# Patient Record
Sex: FEMALE | Race: WHITE
[De-identification: names, ages, dates, MRNs, and addresses within clinical notes are randomized per-mention and may not be internally consistent; named-entity substitution may affect disease eponyms.]

---

## 2014-09-04 VITALS
DIASTOLIC BLOOD PRESSURE: 81 MMHG | SYSTOLIC BLOOD PRESSURE: 150 MMHG | SYSTOLIC BLOOD PRESSURE: 150 MMHG | DIASTOLIC BLOOD PRESSURE: 81 MMHG | SYSTOLIC BLOOD PRESSURE: 150 MMHG | SYSTOLIC BLOOD PRESSURE: 150 MMHG | DIASTOLIC BLOOD PRESSURE: 81 MMHG | DIASTOLIC BLOOD PRESSURE: 81 MMHG | DIASTOLIC BLOOD PRESSURE: 81 MMHG | SYSTOLIC BLOOD PRESSURE: 150 MMHG | DIASTOLIC BLOOD PRESSURE: 81 MMHG | SYSTOLIC BLOOD PRESSURE: 150 MMHG

## 2017-04-24 ENCOUNTER — HOSPITAL ENCOUNTER (OUTPATIENT)
Dept: HOSPITAL 63 - MAMMO | Age: 67
Discharge: HOME | End: 2017-04-24
Attending: FAMILY MEDICINE
Payer: MEDICARE

## 2017-04-24 DIAGNOSIS — Z12.31: Primary | ICD-10-CM

## 2017-04-24 PROCEDURE — 77063 BREAST TOMOSYNTHESIS BI: CPT

## 2017-04-24 NOTE — RAD
EXAM: MAMMO DANII SCREENING BILATERAL.



HISTORY: Screening.



COMPARISON: 4/22/2016 and 4/20/2015.



FINDINGS:



2-D and 3-D tomosynthesis mammograms were obtained of both breasts in the CC

and MLO projections. Computer-aided detection (CAD) was utilized.



The breast parenchyma is primarily fatty (tissue density A). No dominant

suspicious mass, suspicious microcalcifications, or architectural distortion

is identified.



   



IMPRESSION: No mammographic evidence of malignancy.







BI-RADS CATEGORY: 1 NEGATIVE



RECOMMENDED FOLLOW-UP: 12M 12 MONTH FOLLOW-UP



PQRS compliance statement: Patient information was entered into a reminder

system with a target due date     for the next mammogram.



Mammography is a sensitive method for finding small breast cancers, but it

does not detect them all and is not a substitute for careful clinical

examination.  A negative mammogram does not negate a clinically suspicious

finding and should not result in delay in biopsying a clinically suspicious

abnormality.



"Our facility is accredited by the American College of [employer] Mammography

Program."

## 2019-12-19 ENCOUNTER — HOSPITAL ENCOUNTER (OUTPATIENT)
Dept: HOSPITAL 63 - LAB | Age: 69
Discharge: HOME | End: 2019-12-19
Payer: MEDICARE

## 2019-12-19 DIAGNOSIS — R82.71: Primary | ICD-10-CM

## 2019-12-19 PROCEDURE — 87086 URINE CULTURE/COLONY COUNT: CPT

## 2019-12-27 ENCOUNTER — HOSPITAL ENCOUNTER (OUTPATIENT)
Dept: HOSPITAL 63 - LAB | Age: 69
Discharge: HOME | End: 2019-12-27
Payer: MEDICARE

## 2019-12-27 DIAGNOSIS — R82.71: Primary | ICD-10-CM

## 2019-12-27 PROCEDURE — 87086 URINE CULTURE/COLONY COUNT: CPT

## 2020-05-04 ENCOUNTER — HOSPITAL ENCOUNTER (OUTPATIENT)
Dept: HOSPITAL 63 - LAB | Age: 70
Discharge: HOME | End: 2020-05-04
Payer: MEDICARE

## 2020-05-04 DIAGNOSIS — R82.71: Primary | ICD-10-CM

## 2020-05-04 PROCEDURE — 87086 URINE CULTURE/COLONY COUNT: CPT

## 2020-05-14 ENCOUNTER — HOSPITAL ENCOUNTER (OUTPATIENT)
Dept: HOSPITAL 63 - LAB | Age: 70
End: 2020-05-14
Payer: MEDICARE

## 2020-05-14 DIAGNOSIS — N30.20: Primary | ICD-10-CM

## 2020-05-14 PROCEDURE — 87086 URINE CULTURE/COLONY COUNT: CPT

## 2020-05-18 ENCOUNTER — HOSPITAL ENCOUNTER (OUTPATIENT)
Dept: HOSPITAL 63 - RAD | Age: 70
Discharge: HOME | End: 2020-05-18
Attending: PHYSICIAN ASSISTANT
Payer: MEDICARE

## 2020-05-18 DIAGNOSIS — Z01.818: Primary | ICD-10-CM

## 2020-05-18 DIAGNOSIS — M47.814: ICD-10-CM

## 2020-05-18 LAB
ANION GAP SERPL CALC-SCNC: 13 MMOL/L (ref 6–14)
BASOPHILS # BLD AUTO: 0.1 X10^3/UL (ref 0–0.2)
BASOPHILS NFR BLD: 2 % (ref 0–3)
CA-I SERPL ISE-MCNC: 27 MG/DL (ref 7–20)
CALCIUM SERPL-MCNC: 9.5 MG/DL (ref 8.5–10.1)
CHLORIDE SERPL-SCNC: 100 MMOL/L (ref 98–107)
CO2 SERPL-SCNC: 27 MMOL/L (ref 21–32)
CREAT SERPL-MCNC: 0.7 MG/DL (ref 0.6–1)
CREATININE, EXTERNAL: 0.7
EOSINOPHIL NFR BLD: 0.4 X10^3/UL (ref 0–0.7)
EOSINOPHIL NFR BLD: 7 % (ref 0–3)
ERYTHROCYTE [DISTWIDTH] IN BLOOD BY AUTOMATED COUNT: 12.9 % (ref 11.5–14.5)
GFR SERPLBLD BASED ON 1.73 SQ M-ARVRAT: 82.7 ML/MIN
GLUCOSE SERPL-MCNC: 112 MG/DL (ref 70–99)
HCT VFR BLD CALC: 42.6 % (ref 36–47)
HGB BLD-MCNC: 14.4 G/DL (ref 12–15.5)
LYMPHOCYTES # BLD: 1.6 X10^3/UL (ref 1–4.8)
LYMPHOCYTES NFR BLD AUTO: 29 % (ref 24–48)
MCH RBC QN AUTO: 31 PG (ref 25–35)
MCHC RBC AUTO-ENTMCNC: 34 G/DL (ref 31–37)
MCV RBC AUTO: 90 FL (ref 79–100)
MONO #: 0.5 X10^3/UL (ref 0–1.1)
MONOCYTES NFR BLD: 8 % (ref 0–9)
NEUT #: 3.1 X10^3UL (ref 1.8–7.7)
NEUTROPHILS NFR BLD AUTO: 54 % (ref 31–73)
PLATELET # BLD AUTO: 262 X10^3/UL (ref 140–400)
POTASSIUM SERPL-SCNC: 3.7 MMOL/L (ref 3.5–5.1)
RBC # BLD AUTO: 4.72 X10^6/UL (ref 3.5–5.4)
SODIUM SERPL-SCNC: 140 MMOL/L (ref 136–145)
WBC # BLD AUTO: 5.8 X10^3/UL (ref 4–11)

## 2020-05-18 PROCEDURE — 87641 MR-STAPH DNA AMP PROBE: CPT

## 2020-05-18 PROCEDURE — 93005 ELECTROCARDIOGRAM TRACING: CPT

## 2020-05-18 PROCEDURE — 71046 X-RAY EXAM CHEST 2 VIEWS: CPT

## 2020-05-18 PROCEDURE — 85025 COMPLETE CBC W/AUTO DIFF WBC: CPT

## 2020-05-18 PROCEDURE — 80048 BASIC METABOLIC PNL TOTAL CA: CPT

## 2020-05-18 PROCEDURE — 36415 COLL VENOUS BLD VENIPUNCTURE: CPT

## 2020-05-18 NOTE — EKG
[facility] [address], [place] [address]

Test Date:    2020               Test Time:    08:55:57

Pat Name:     RODRIGUEZ REDDING           Department:   

Patient ID:   [id number]           Room:          

Gender:                                Technician:   

:          1950               Requested By: STAFF NON

Order Number: [id number]            Reading MD:   Isaias Nguyen MD

                                 Measurements

Intervals                              Axis          

Rate:                                  P:            

VA:                                    QRS:          

QRSD:                                  T:            

QT:                                                  

QTc:                                                 

                           Interpretive Statements

SR



Electronically Signed On 2020 9:32:21 CDT by Isaias Nguyen MD

## 2020-05-18 NOTE — RAD
CHEST PA   LATERAL 

 

INDICATION: Preoperative, knee surgery. 

 

COMPARISON STUDY: 4/2/2010.

 

FINDINGS:

 

Lungs: Normal lung volume. No pulmonary mass or consolidation. The 

tracheobronchial tree and hilar structures are normal.

 

Pleura: No pleural effusion or pneumothorax.

 

Heart and Mediastinum: The cardiomediastinal silhouette is normal. 

Tortuosity of the thoracic aorta.

 

Bones and Soft Tissues: Right convex thoracic curvature. Multilevel 

degenerative changes of the spine.

 

IMPRESSION:  

No acute cardiopulmonary process.

 

Electronically signed by: Toni Fallon MD (5/18/2020 9:04 AM) [id number]

## 2020-05-20 NOTE — EKG
[facility] [address], [place] [address]

Test Date:    2020               Test Time:    08:55:57

Pat Name:     RODRIUGEZ REDDING           Department:   

Patient ID:   [id number]           Room:          

Gender:       F                        Technician:   

:          1950               Requested By: NATALIA YEN JR

Order Number: [id number]            Reading MD:   Ned Moran

                                 Measurements

Intervals                              Axis          

Rate:         69                       P:            49

OH:           168                      QRS:          23

QRSD:         86                       T:            42

QT:           372                                    

QTc:          400                                    

                           Interpretive Statements

SINUS RHYTHM

QRS(T) CONTOUR ABNORMALITY

CONSIDER INFERIOR INFARCT

POSSIBLY ABNORMAL ECG

RI6.02

No previous ECG available for comparison



Electronically Signed On 2020 7:57:48 CDT by Ned Moran

## 2020-07-15 VITALS — BODY MASS INDEX: 35.85 KG/M2 | WEIGHT: 210 LBS | HEIGHT: 64 IN

## 2020-07-15 RX ORDER — OXYCODONE AND ACETAMINOPHEN 5; 325 MG/1; MG/1
TABLET ORAL
COMMUNITY
End: 2021-06-21 | Stop reason: ALTCHOICE

## 2020-07-15 RX ORDER — LEVOTHYROXINE SODIUM 175 UG/1
175 TABLET ORAL
COMMUNITY
End: 2021-06-21 | Stop reason: ALTCHOICE

## 2020-07-15 RX ORDER — TEMAZEPAM 30 MG/1
CAPSULE ORAL
COMMUNITY

## 2020-07-15 RX ORDER — ESTRADIOL 0.1 MG/G
2 CREAM VAGINAL DAILY
COMMUNITY

## 2020-07-15 RX ORDER — DOXYCYCLINE 100 MG/1
100 CAPSULE ORAL 2 TIMES DAILY
COMMUNITY

## 2020-07-15 RX ORDER — AMOXICILLIN 500 MG/1
500 CAPSULE ORAL
COMMUNITY
End: 2021-06-21 | Stop reason: ALTCHOICE

## 2020-07-15 RX ORDER — CEPHALEXIN 500 MG/1
500 CAPSULE ORAL 4 TIMES DAILY
COMMUNITY

## 2020-07-15 RX ORDER — IBANDRONATE SODIUM 150 MG/1
150 TABLET, FILM COATED ORAL
COMMUNITY
End: 2021-06-21 | Stop reason: ALTCHOICE

## 2020-07-15 RX ORDER — LEVOTHYROXINE SODIUM 150 UG/1
TABLET ORAL
COMMUNITY

## 2020-07-15 RX ORDER — DEXAMETHASONE 0.5 MG/5ML
SOLUTION ORAL DAILY
COMMUNITY

## 2020-07-15 RX ORDER — CIPROFLOXACIN 500 MG/1
TABLET ORAL 2 TIMES DAILY
COMMUNITY

## 2020-07-15 RX ORDER — CEFDINIR 300 MG/1
300 CAPSULE ORAL 2 TIMES DAILY
COMMUNITY

## 2020-07-15 RX ORDER — RABEPRAZOLE SODIUM 20 MG/1
20 TABLET, DELAYED RELEASE ORAL DAILY
COMMUNITY

## 2020-07-15 RX ORDER — PROMETHAZINE HYDROCHLORIDE AND CODEINE PHOSPHATE 6.25; 1 MG/5ML; MG/5ML
SOLUTION ORAL
COMMUNITY
End: 2021-06-21 | Stop reason: ALTCHOICE

## 2020-07-15 RX ORDER — AMPICILLIN 500 MG/1
CAPSULE ORAL
COMMUNITY
End: 2021-06-21 | Stop reason: ALTCHOICE

## 2020-07-15 RX ORDER — ALBUTEROL SULFATE 90 UG/1
AEROSOL, METERED RESPIRATORY (INHALATION)
COMMUNITY

## 2020-07-15 RX ORDER — DICYCLOMINE HYDROCHLORIDE 10 MG/1
10 CAPSULE ORAL
COMMUNITY

## 2020-07-15 RX ORDER — AMOXICILLIN AND CLAVULANATE POTASSIUM 875; 125 MG/1; MG/1
TABLET, FILM COATED ORAL EVERY 12 HOURS
COMMUNITY
End: 2021-06-21 | Stop reason: ALTCHOICE

## 2020-07-15 RX ORDER — LOSARTAN POTASSIUM 50 MG/1
TABLET ORAL DAILY
COMMUNITY

## 2020-07-15 RX ORDER — METHYLPREDNISOLONE 4 MG/1
TABLET ORAL
COMMUNITY
End: 2021-06-21 | Stop reason: ALTCHOICE

## 2020-07-15 RX ORDER — LEVOFLOXACIN 750 MG/1
750 TABLET ORAL DAILY
COMMUNITY
End: 2020-07-30 | Stop reason: SDUPTHER

## 2020-07-15 RX ORDER — GABAPENTIN 100 MG/1
CAPSULE ORAL 3 TIMES DAILY
COMMUNITY

## 2020-07-15 RX ORDER — PREDNISOLONE ACETATE 10 MG/ML
1 SUSPENSION/ DROPS OPHTHALMIC 4 TIMES DAILY
COMMUNITY
End: 2021-06-21 | Stop reason: ALTCHOICE

## 2020-07-15 RX ORDER — MELOXICAM 7.5 MG/1
TABLET ORAL DAILY
COMMUNITY

## 2020-07-15 RX ORDER — TRIAMCINOLONE ACETONIDE 1 MG/G
CREAM TOPICAL 2 TIMES DAILY
COMMUNITY
End: 2021-06-21 | Stop reason: ALTCHOICE

## 2020-07-15 RX ORDER — EPINEPHRINE 0.3 MG/.3ML
0.3 INJECTION SUBCUTANEOUS
COMMUNITY

## 2020-07-29 ENCOUNTER — TELEPHONE (OUTPATIENT)
Dept: ORTHOPEDIC SURGERY | Age: 70
End: 2020-07-29

## 2020-07-29 DIAGNOSIS — Z01.818 PREOP TESTING: Primary | ICD-10-CM

## 2020-07-30 ENCOUNTER — OFFICE VISIT (OUTPATIENT)
Dept: ORTHOPEDIC SURGERY | Age: 70
End: 2020-07-30

## 2020-07-30 VITALS — BODY MASS INDEX: 36.65 KG/M2 | WEIGHT: 220 LBS | HEIGHT: 65 IN

## 2020-07-30 DIAGNOSIS — M25.562 LEFT KNEE PAIN, UNSPECIFIED CHRONICITY: Primary | ICD-10-CM

## 2020-07-30 DIAGNOSIS — E66.01 SEVERE OBESITY (HCC): ICD-10-CM

## 2020-07-30 DIAGNOSIS — M17.12 PRIMARY OSTEOARTHRITIS OF LEFT KNEE: Primary | ICD-10-CM

## 2020-07-30 RX ORDER — VITAMIN E 1000 UNIT
1000 CAPSULE ORAL
COMMUNITY

## 2020-07-30 RX ORDER — LEVOFLOXACIN 250 MG/1
TABLET ORAL
COMMUNITY
Start: 2020-07-23

## 2020-07-30 RX ORDER — FERROUS SULFATE, DRIED 160(50) MG
2 TABLET, EXTENDED RELEASE ORAL 2 TIMES DAILY WITH MEALS
COMMUNITY

## 2020-07-30 RX ORDER — GUAIFENESIN 100 MG/5ML
81 LIQUID (ML) ORAL DAILY
COMMUNITY

## 2020-07-30 RX ORDER — DEXAMETHASONE SODIUM PHOSPHATE 1 MG/ML
SOLUTION/ DROPS OPHTHALMIC
COMMUNITY

## 2020-07-30 RX ORDER — CYCLOSPORINE 0.5 MG/ML
EMULSION OPHTHALMIC
COMMUNITY

## 2020-07-30 RX ORDER — MINERAL OIL
180 ENEMA (ML) RECTAL DAILY
COMMUNITY

## 2020-07-30 RX ORDER — DENOSUMAB 60 MG/ML
INJECTION SUBCUTANEOUS
COMMUNITY

## 2020-07-30 RX ORDER — AMLODIPINE BESYLATE 5 MG/1
5 TABLET ORAL
COMMUNITY
Start: 2020-07-17

## 2020-07-30 RX ORDER — OXYCODONE AND ACETAMINOPHEN 5; 325 MG/1; MG/1
1 TABLET ORAL
Qty: 30 TAB | Refills: 0 | Status: SHIPPED | OUTPATIENT
Start: 2020-07-30 | End: 2020-08-04

## 2020-07-30 RX ORDER — CEPHALEXIN 500 MG/1
500 CAPSULE ORAL 4 TIMES DAILY
Qty: 4 CAP | Refills: 0 | Status: SHIPPED | OUTPATIENT
Start: 2020-07-30 | End: 2020-07-31

## 2020-07-30 RX ORDER — CALCIUM CARB/VITAMIN D3/VIT K1 500-500-40
1 TABLET,CHEWABLE ORAL DAILY
COMMUNITY

## 2020-07-30 RX ORDER — IBUPROFEN 200 MG
400 TABLET ORAL
COMMUNITY
End: 2021-06-21 | Stop reason: ALTCHOICE

## 2020-07-30 NOTE — PROGRESS NOTES
Subjective:     Chelsi Holguin is a 79 y.o. female who presents today for surgical preop visit. Past Medical History:   Diagnosis Date    Arthritis 04/29/2020    osteo    Heart disease     Hypertension        No past surgical history on file. Current Outpatient Medications   Medication Sig Dispense Refill    albuterol (PROVENTIL HFA, VENTOLIN HFA, PROAIR HFA) 90 mcg/actuation inhaler Take  by inhalation.  amoxicillin (AMOXIL) 500 mg capsule Take 500 mg by mouth.  cephALEXin (KEFLEX) 500 mg capsule Take 500 mg by mouth four (4) times daily.  dexAMETHasone (DECADRON) 0.5 mg/5 mL solution Take  by mouth daily.  dicyclomine (BENTYL) 10 mg capsule Take 10 mg by mouth 4 times daily (before meals and nightly).  doxycycline (VIBRAMYCIN) 100 mg capsule Take 100 mg by mouth two (2) times a day.  EPINEPHrine (EPIPEN) 0.3 mg/0.3 mL injection 0.3 mg by IntraMUSCular route once as needed for Allergic Response.  estradioL (ESTRACE) 0.01 % (0.1 mg/gram) vaginal cream Insert 2 g into vagina daily.  gabapentin (NEURONTIN) 100 mg capsule Take  by mouth three (3) times daily.  ibandronate (BONIVA) 150 mg tablet Take 150 mg by mouth every thirty (30) days.  levoFLOXacin (LEVAQUIN) 750 mg tablet Take 750 mg by mouth daily.  levothyroxine (SYNTHROID) 150 mcg tablet Take  by mouth Daily (before breakfast).  levothyroxine (SYNTHROID) 175 mcg tablet Take 175 mcg by mouth Daily (before breakfast).  losartan (COZAAR) 50 mg tablet Take  by mouth daily.  meloxicam (MOBIC) 7.5 mg tablet Take  by mouth daily.  methylPREDNISolone (MEDROL DOSEPACK) 4 mg tablet Take  by mouth.  oxyCODONE-acetaminophen (PERCOCET) 5-325 mg per tablet Take  by mouth every four (4) hours as needed for Pain.  prednisoLONE acetate (PRED FORTE) 1 % ophthalmic suspension Administer 1 Drop to both eyes four (4) times daily.       promethazine-codeine (PHENERGAN with CODEINE) 6.25-10 mg/5 mL syrup Take  by mouth four (4) times daily as needed for Cough.  RABEprazole (ACIPHEX) 20 mg tablet Take 20 mg by mouth daily.  temazepam (RESTORIL) 30 mg capsule Take  by mouth nightly as needed for Sleep.  triamcinolone acetonide (KENALOG) 0.1 % topical cream Apply  to affected area two (2) times a day. use thin layer      amoxicillin-clavulanate (AUGMENTIN) 875-125 mg per tablet Take  by mouth every twelve (12) hours.  ampicillin (PRINCIPEN) 500 mg capsule Take  by mouth Before breakfast, lunch, dinner and at bedtime.  cefdinir (OMNICEF) 300 mg capsule Take 300 mg by mouth two (2) times a day.  ciprofloxacin HCl (CIPRO) 500 mg tablet Take  by mouth two (2) times a day. Allergies   Allergen Reactions    Sulfa (Sulfonamide Antibiotics) Unknown (comments)       ROS:  Patient is a pleasant appearing individual, appropriately dressed, well hydrated, well nourished, who is alert, appropriately oriented for age, and in no acute distress with a normal gait and normal affect who does not appear to be in any significant pain. Please note that this patient will be scheduled for surgery and that Dr. Kusum Carrillo is requesting a pre-op medical clearance for GENERAL Anesthesia. Our office will schedule the appt for the medical clearance with the medical provider. After evaluating the patient, please fax all labs, EKGs, diagnostic studies, and a risk assessment / clearance note (that is legible or typed ) indicating if the patient is medically cleared to Dr. Kusum Carrillo (Attn: Surgery Scheduling) ASAP to [phone]. If the patient is not or will not be medically cleared prior to the surgery date please notify Dr. Neel Zamora office. Thank you for assisting me in this patient's care. The patient was counseled about the risks of mau Covid-19 during their perioperative period and any recovery window from their procedure.  The patient was made aware that mau Covid-19 may worsen their prognosis for recovering from their procedure and lend to a higher morbidity and/or mortality risk. All material risks, benefits, and reasonable alternatives including postponing the procedure were discussed. The patient DOES wish to proceed with their procedure at this time. Physical Exam:  Left Knee -Decrease range of motion with flexion, Knee arc of greater than 50 degrees, Some crepitation, Grossly neurovascularly intact, Good cap refill, No skin lesion, Moderate swelling, No gross instability, Some quadriceps weakness, Kellgren and Gutierrez at least grade 3    Right Knee - Full Range of Motion, No crepitation, Grossly neurovascularly intact, Good cap refill, No skin lesion, No swelling, No gross instability, No quadriceps weakness      ICD-10-CM ICD-9-CM    1. Primary osteoarthritis of left knee  M17.12 715.16        HPI:  The patient is here for a left total knee replacement preop visit[place] The patient would like to be done on an outpatient basis using old clearances. To note, the patient did have the right total knee replacement done earlier this year. The patient did have what appeared to be a reaction to adhesive and the Ioban and possibly the ChloraPrep. We will forgo the ChloraPrep wash pre-surgical washes and consider no Ioban prior to surgery with alcohol wipes being used. We will consult further with Dr. Shelly Solano prior to surgery. The patient did very well on the right otherwise, full range of motion, did well. We will get the old sizing postop note for that as well. X-rays in the past did show severe degenerative changes of the left knee failed conservative treatment. Assessment/Plan:  1. Patient with severe left knee OA. Left total knee replacement on an outpatient basis, old clearances. Patient cleared for surgery. Stop all blood thinners 7-10 days prior to surgery.   We will send in her pain prescription and antibiotic to be used postoperatively only. We will send those in today. The patient would like to do therapy here for one week after surgery. She will go home on 8/12/2020, which is her plan, as long as everything progresses appropriately. If the patient gets any new abrasion on the surgical limb, she will let us know as soon as possible, and we will go from there.             JEANNETTE Munoz

## 2020-08-03 DIAGNOSIS — M17.12 PRIMARY OSTEOARTHRITIS OF LEFT KNEE: Primary | ICD-10-CM

## 2020-08-03 DIAGNOSIS — Z01.818 PREOP TESTING: ICD-10-CM

## 2020-08-03 RX ORDER — CEPHALEXIN 500 MG/1
500 CAPSULE ORAL 4 TIMES DAILY
Qty: 4 CAP | Refills: 0 | Status: SHIPPED | OUTPATIENT
Start: 2020-08-03 | End: 2020-08-04

## 2020-08-05 ENCOUNTER — OFFICE VISIT (OUTPATIENT)
Dept: ORTHOPEDIC SURGERY | Age: 70
End: 2020-08-05
Payer: MEDICARE

## 2020-08-05 DIAGNOSIS — M17.12 PRIMARY OSTEOARTHRITIS OF LEFT KNEE: Primary | ICD-10-CM

## 2020-08-05 PROCEDURE — 99024 POSTOP FOLLOW-UP VISIT: CPT | Performed by: ORTHOPAEDIC SURGERY

## 2020-08-06 DIAGNOSIS — M25.562 LEFT KNEE PAIN, UNSPECIFIED CHRONICITY: Primary | ICD-10-CM

## 2020-08-07 ENCOUNTER — OFFICE VISIT (OUTPATIENT)
Dept: ORTHOPEDIC SURGERY | Age: 70
End: 2020-08-07
Payer: MEDICARE

## 2020-08-07 DIAGNOSIS — M25.561 CHRONIC PAIN OF RIGHT KNEE: ICD-10-CM

## 2020-08-07 DIAGNOSIS — Z96.651 HISTORY OF TOTAL RIGHT KNEE REPLACEMENT: Primary | ICD-10-CM

## 2020-08-07 DIAGNOSIS — G89.18 POST-OP PAIN: ICD-10-CM

## 2020-08-07 DIAGNOSIS — G89.29 CHRONIC PAIN OF RIGHT KNEE: ICD-10-CM

## 2020-08-07 PROCEDURE — 99024 POSTOP FOLLOW-UP VISIT: CPT | Performed by: PHYSICIAN ASSISTANT

## 2020-08-07 RX ORDER — CEPHALEXIN 500 MG/1
500 CAPSULE ORAL 4 TIMES DAILY
Qty: 28 CAP | Refills: 0 | Status: SHIPPED | OUTPATIENT
Start: 2020-08-07 | End: 2020-08-14

## 2020-08-07 RX ORDER — HYDROMORPHONE HYDROCHLORIDE 2 MG/1
2 TABLET ORAL
Qty: 30 TAB | Refills: 0 | Status: SHIPPED | OUTPATIENT
Start: 2020-08-07 | End: 2020-08-10

## 2020-08-07 NOTE — PATIENT INSTRUCTIONS
--During the patient's visit today, the patient was instructed to no longer wear the compression stocking on the unaffected leg. They were instructed that the compression stocking on the affected leg should be continued for a total of 3 weeks postoperatively. They were also reminded to continue their blood thinning medication (Aspirin) for a total of 3 weeks postoperatively as instructed at the time of discharge. Their dressing was taken down and will not need to be replaced. --The patient was instructed that they may now shower and the incision may get wet. The patient was asked not to [facility]" their wound. They are reminded that although they may shower they should avoid baths, hot tubs, lotions, pools, lakes, and springs. Patient was instructed to make sure that when the wound does get wet to dry it very well. They were also reminded to not yet use any creams, lotions, ointments, salves, antibiotic creams, or AAA. Patient was told to leave the surgical tape that is covering the incision intact for another week. At that point it can be removed. It may be slightly easier if a very small thin layer of Vaseline is utilized to break up the glue holding it in place. Once the glue comes off it is imperative they remove the Vaseline. Patient may drive once they are in no pain and on no pain medication. --Patient will have a follow-up appointment in the next several weeks to assess progression in physical therapy and to receive further instruction. Patient was cautioned to be on the look out for increased swelling of the surgical knee, spreading of redness or warmth that persists around the incision site. They were reminded that some swelling or redness following physical therapy is not abnormal.  They were also instructed to be on the look out for drainage and/or pus coming from the incision site.   Again they were warned about a fever of 101.5 that does not respond to Tylenol and and inability to bear weight on affected leg. They were also cautioned about an acute increase in pain that persists. If any of these issues should arise they will not hesitate to contact our office for further evaluation and care. Additional pain management was reviewed with the patient and if additional prescription pain medication is required it has been sent to the patient's pharmacy. Continue Stocking, Do not drive if in any pain or on any pain medication. Walk with walker. Questions were solicited and answered to the patient's satisfaction and the patient will follow-up as discussed.

## 2020-08-07 NOTE — PROGRESS NOTES
Name: Kunal Treviño    : 1950  Service Dept: [address] [place] [address] MEDICINE         Chief Complaint   Patient presents with    Surgical Follow-up     Left knee        Patient's Pharmacies:    [facility] [address], [address] [place], [place] [id number] [facility] [address] [address]  Phone: [phone] Fax: [phone]       There were no vitals taken for this visit. Allergies   Allergen Reactions    Adhesive Anaphylaxis and Rash    Macrolide Antibiotics Anaphylaxis and Rash    Sulfa (Sulfonamide Antibiotics) Unknown (comments), Anaphylaxis, Swelling and Shortness of Breath    Lisinopril Swelling     Lip swelling  Lip swelling      Metoprolol Succinate Other (comments)    Verapamil Other (comments)    Alendronate Other (comments) and Rash    Ezetimibe Other (comments)    Nitrofurantoin Monohyd/M-Cryst Rash    Povidone-Iodine Other (comments)     Orange prep solution, had like a chemical burn    Statins-Hmg-Coa Reductase Inhibitors Other (comments)    Trimaphen Rash        Current Outpatient Medications   Medication Sig Dispense Refill    ZINC SULFATE PO Take  by mouth.  cholecalciferol, vitamin d3, (Vitamin D3) 10 mcg (400 unit) cap Take 1 Tab by mouth daily.  docosahexaenoic acid/epa (FISH OIL PO) Take 1 Cap by mouth daily.  Lactobac no.41/Bifidobact no.7 (PROBIOTIC-10 PO) Take 1 Cap by mouth.  cycloSPORINE (Restasis) 0.05 % dpet Restasis 0.05 % eye drops in a dropperette      amLODIPine (NORVASC) 5 mg tablet Take 5 mg by mouth.  ascorbic acid, vitamin C, (VITAMIN C) 1,000 mg tablet Take 1,000 mg by mouth.  aspirin 81 mg chewable tablet Take 81 mg by mouth daily.  calcium-vitamin D (OYSTER SHELL) 500 mg(1,250mg) -200 unit per tablet Take 2 Tabs by mouth two (2) times daily (with meals).       denosumab (Prolia) 60 mg/mL injection Prolia 60 mg/mL subcutaneous syringe      dexamethasone (DECADRON) 0.1 % ophthalmic solution dexamethasone sodium phosphate 0.1 % eye drops      fexofenadine (ALLEGRA) 180 mg tablet Take 180 mg by mouth daily.  ibuprofen (MOTRIN) 200 mg tablet Take 400 mg by mouth.  Sea-Omega 500-1,000 mg cap Take 1,000 mg by mouth.  levoFLOXacin (LEVAQUIN) 250 mg tablet TK 1 T PO D FOR 5 DAYS      albuterol (PROVENTIL HFA, VENTOLIN HFA, PROAIR HFA) 90 mcg/actuation inhaler Take  by inhalation.  amoxicillin (AMOXIL) 500 mg capsule Take 500 mg by mouth.  cephALEXin (KEFLEX) 500 mg capsule Take 500 mg by mouth four (4) times daily.  dexAMETHasone (DECADRON) 0.5 mg/5 mL solution Take  by mouth daily.  dicyclomine (BENTYL) 10 mg capsule Take 10 mg by mouth 4 times daily (before meals and nightly).  doxycycline (VIBRAMYCIN) 100 mg capsule Take 100 mg by mouth two (2) times a day.  EPINEPHrine (EPIPEN) 0.3 mg/0.3 mL injection 0.3 mg by IntraMUSCular route once as needed for Allergic Response.  estradioL (ESTRACE) 0.01 % (0.1 mg/gram) vaginal cream Insert 2 g into vagina daily.  gabapentin (NEURONTIN) 100 mg capsule Take  by mouth three (3) times daily.  ibandronate (BONIVA) 150 mg tablet Take 150 mg by mouth every thirty (30) days.  levothyroxine (SYNTHROID) 150 mcg tablet Take  by mouth Daily (before breakfast).  levothyroxine (SYNTHROID) 175 mcg tablet Take 175 mcg by mouth Daily (before breakfast).  losartan (COZAAR) 50 mg tablet Take  by mouth daily.  meloxicam (MOBIC) 7.5 mg tablet Take  by mouth daily.  methylPREDNISolone (MEDROL DOSEPACK) 4 mg tablet Take  by mouth.  oxyCODONE-acetaminophen (PERCOCET) 5-325 mg per tablet Take  by mouth every four (4) hours as needed for Pain.  prednisoLONE acetate (PRED FORTE) 1 % ophthalmic suspension Administer 1 Drop to both eyes four (4) times daily.       promethazine-codeine (PHENERGAN with CODEINE) 6.25-10 mg/5 mL syrup Take  by mouth four (4) times daily as needed for Cough.  RABEprazole (ACIPHEX) 20 mg tablet Take 20 mg by mouth daily.  temazepam (RESTORIL) 30 mg capsule Take  by mouth nightly as needed for Sleep.  triamcinolone acetonide (KENALOG) 0.1 % topical cream Apply  to affected area two (2) times a day. use thin layer      amoxicillin-clavulanate (AUGMENTIN) 875-125 mg per tablet Take  by mouth every twelve (12) hours.  ampicillin (PRINCIPEN) 500 mg capsule Take  by mouth Before breakfast, lunch, dinner and at bedtime.  cefdinir (OMNICEF) 300 mg capsule Take 300 mg by mouth two (2) times a day.  ciprofloxacin HCl (CIPRO) 500 mg tablet Take  by mouth two (2) times a day. Patient Active Problem List   Diagnosis Code    Severe obesity ([facility] [address] E66.01        Family History   Problem Relation Age of Onset    Cancer Mother     Heart Disease Mother     Heart Disease Father         Social History     Socioeconomic History    Marital status:      Spouse name: Not on file    Number of children: Not on file    Years of education: Not on file    Highest education level: Not on file   Tobacco Use    Smoking status: Former Smoker    Smokeless tobacco: Never Used   Substance and Sexual Activity    Alcohol use: Yes     Comment: Occasional        Past Surgical History:   Procedure Laterality Date    HX KNEE REPLACEMENT          Past Medical History:   Diagnosis Date    Arthritis 04/29/2020    osteo    Heart disease     Hypertension         I have reviewed and agree with [address] [place] [address] Street [address] and [facility] and intake form in chart and the record. Review of Systems:     Patient is a pleasant appearing individual, appropriately dressed, well hydrated, well nourished, who is alert, appropriately oriented for age, and in no acute distress with a normal gait and normal affect who does not appear to be in any significant pain.      Physical Exam:  Left knee - Neurovascularly intact with good cap refill, full range of motion and full strength, well healed incision noted, no swelling, no erythema, no instability. Right knee - Decrease range of motion with flexion, Some crepitation, Grossly neurovascularly intact, Good cap refill, No skin lesion, Moderate swelling, No gross instability, Some quadriceps weakness          Scribed by Blu Majano as dictated by [facility] LIAM Amos MD.    Physical examination shows a well-healing incision, good capillary refill, grossly neurovascularly intact, and no stability. HPI:  The patient is here status post left total knee replacement two days out, doing well. No new complaints, just a little bit of soreness. Assessment/Plan:  Plan at this point, we will see her back as needed. No restrictions from my standpoint. Her surgery was two days ago, and she is doing well. Return to Office: Follow-up Information    None             Documentation True and Accepted Prashanth Amos MD

## 2020-08-07 NOTE — PATIENT INSTRUCTIONS
Knee Pain or Injury: Care Instructions  Your Care Instructions     Injuries are a common cause of knee problems. Sudden (acute) injuries may be caused by a direct blow to the knee. They can also be caused by abnormal twisting, bending, or falling on the knee. Pain, bruising, or swelling may be severe, and may start within minutes of the injury. Overuse is another cause of knee pain. Other causes are climbing stairs, kneeling, and other activities that use the knee. Everyday wear and tear, especially as you get older, also can cause knee pain. Rest, along with home treatment, often relieves pain and allows your knee to heal. If you have a serious knee injury, you may need tests and treatment. Follow-up care is a key part of your treatment and safety. Be sure to make and go to all appointments, and call your doctor if you are having problems. It's also a good idea to know your test results and keep a list of the medicines you take. How can you care for yourself at home? · Be safe with medicines. Read and follow all instructions on the label. ? If the doctor gave you a prescription medicine for pain, take it as prescribed. ? If you are not taking a prescription pain medicine, ask your doctor if you can take an over-the-counter medicine. · Rest and protect your knee. Take a break from any activity that may cause pain. · Put ice or a cold pack on your knee for 10 to 20 minutes at a time. Put a thin cloth between the ice and your skin. · Prop up a sore knee on a pillow when you ice it or anytime you sit or lie down for the next 3 days. Try to keep it above the level of your heart. This will help reduce swelling. · If your knee is not swollen, you can put moist heat, a heating pad, or a warm cloth on your knee. · If your doctor recommends an elastic bandage, sleeve, or other type of support for your knee, wear it as directed.   · Follow your doctor's instructions about how much weight you can put on your leg. Use a cane, crutches, or a walker as instructed. · Follow your doctor's instructions about activity during your healing process. If you can do mild exercise, slowly increase your activity. · Reach and stay at a healthy weight. Extra weight can strain the joints, especially the knees and hips, and make the pain worse. Losing even a few pounds may help. When should you call for help? [id number] anytime you think you may need emergency care. For example, call if:  · You have symptoms of a blood clot in your lung (called a pulmonary embolism). These may include:  ? Sudden chest pain. ? Trouble breathing. ? Coughing up blood. Call your doctor now or seek immediate medical care if:  · You have severe or increasing pain. · Your leg or foot turns cold or changes color. · You cannot stand or put weight on your knee. · Your knee looks twisted or bent out of shape. · You cannot move your knee. · You have signs of infection, such as:  ? Increased pain, swelling, warmth, or redness. ? Red streaks leading from the knee. ? Pus draining from a place on your knee. ? A fever. · You have signs of a blood clot in your leg (called a deep vein thrombosis), such as:  ? Pain in your calf, back of the knee, thigh, or groin. ? Redness and swelling in your leg or groin. Watch closely for changes in your health, and be sure to contact your doctor if:  · You have tingling, weakness, or numbness in your knee. · You have any new symptoms, such as swelling. · You have bruises from a knee injury that last longer than 2 weeks. · You do not get better as expected. Where can you learn more? Go to http://brandy-sofia.info/  Enter K195 in the search box to learn more about \"Knee Pain or Injury: Care Instructions. \"  Current as of: June 26, 2019               Content Version: 12.5  © 9537-9986 [employer].    Care instructions adapted under license by [employer] (which disclaims liability or warranty for this information). If you have questions about a medical condition or this instruction, always ask your healthcare professional. [place] [address] any warranty or liability for your use of this information.

## 2020-08-07 NOTE — PROGRESS NOTES
[facility]  Total Knee Replacement Follow up    Subjective:    Shey Perez is a 79 y.o. female presents for postop care status post Left total knee arthroplasty performed on 3 August 2020 (POD 4) by Dr. Cristobal Cueva. Pain is not currently well controlled. She isolates the pain related to her left calf. Ultrasound performed today demonstrates no evidence of left lower extremity DVT. Distal posterior tibial vein is patent however for visualization of the mid and proximal portions. Patient has begun physical therapy. Dressing is in place. She has not been wearing her compression stockings as directed patient is scheduled to return home via airplane tomorrow. Pt has been taking blood thinning medication as recommended. Ambulating with some difficulty. The patient refuses to use a walker or cane. The patient's wound is mildly erythematous without induration or fluctuance there is no blanching to speak of. Otherwise without complaint. ROS  Patient is a pleasant appearing individual, appropriately dressed, well hydrated, well nourished, who is alert, appropriately oriented for age, and in no acute distress with a limp gait and normal affect who does appear mildly uncomfortable. Objective:     VSS AFEB        Right knee - Neurovascularly intact with good cap refill, full range of motion and full strength, well healed incision noted, no swelling, no erythema, no instability. Left knee - Decreased range of motion with flexion, no crepitation, Grossly neurovascularly intact, Good cap refill, wound clean, dry, and intact without evidence of drainage, induration, or fluctuance, Moderate swelling, No gross instability, Some quadriceps weakness. Some ecchymosis with erythema, but no out right warmth, or signs of infection. Assessment:     History of total right knee replacement [Z96.651]   Postoperative pain    Doing well post operatively.   I believe the patient is likely behind on her pain medications. She states that she has not been taking them regularly because she has been driving. The patient I had a graciela discussion with regard to the importance of compliance following surgery. She understands that poor compliance could lead to poor outcomes, infection, bleeding, loss of limb, failure or infection of implant, blood clot, and or death. She was given detailed instructions with how to proceed and states that she will plan to do so. I have given her prescription for Dilaudid today and counseled her with regard to the fact that she will not be able to drive while she is on any pain medication or any pain. She is walking so poorly without any assistive devices that I have stressed the importance of obtaining a walker and I have even given her another prescription to facilitate this. Signs of worsening of her condition were reviewed and should they occur she will not hesitate to make me aware. I have also given her a prescription for Keflex With regard to the minor erythema around her wound. Although I have a low index of suspicion, due to the fact that she will not be local so I will not be able to assess the wound properly I will prophylactically provide her with antibiotics. She will start all of her medications as soon as she leaves the office and should she not feel better prior to her flight tomorrow she will contact Dr. Chelsea Martinez for further instructions. Ms. Jen Armstrong has a reminder for a \"due or due soon\" health maintenance. I have asked that she contact her primary care provider for follow-up on this health maintenance. Plan:     --During the patient's visit today, the patient was instructed to no longer wear the compression stocking on the unaffected leg. They were instructed that the compression stocking on the affected leg should be continued for a total of 3 weeks postoperatively.   They were also reminded to continue their blood thinning medication for a total of 3 weeks postoperatively as instructed at the time of discharge. Their dressing was taken down and will not need to be replaced. --The patient was instructed that starting Monday they may now shower and the incision may get wet. The patient was asked not to [facility]" their wound. They are reminded that although they may shower they should avoid baths, hot tubs, lotions, pools, lakes, and springs. Patient was instructed to make sure that when the wound does get wet to dry it very well. They were also reminded to not yet use any creams, lotions, ointments, salves, antibiotic creams, or AAA. Patient was told to leave the surgical tape that is covering the incision intact for another week. At that point it can be removed. It may be slightly easier if a very small thin layer of Vaseline is utilized to break up the glue holding it in place. Once the glue comes off it is imperative they remove the Vaseline. Patient may drive once they are in no pain and on no pain medication. --Patient will have a follow-up appointment in the next several weeks to assess progression in physical therapy and to receive further instruction. Patient was cautioned to be on the look out for increased swelling of the surgical knee, spreading of redness or warmth that persists around the incision site. They were reminded that some swelling or redness following physical therapy is not abnormal.  They were also instructed to be on the look out for drainage and/or pus coming from the incision site. Again they were warned about a fever of 101.5 that does not respond to Tylenol and and inability to bear weight on affected leg. They were also cautioned about an acute increase in pain that persists. If any of these issues should arise they will not hesitate to contact our office for further evaluation and care.     Additional pain management was reviewed with the patient and if additional prescription pain medication is required it has been sent to the patient's pharmacy. Questions were solicited and answered to the patient's satisfaction and the patient will follow-up as discussed.       Signed By: Cortes Escobar MS, PA-C    August 7, 2020

## 2020-08-10 DIAGNOSIS — M25.562 LEFT KNEE PAIN, UNSPECIFIED CHRONICITY: ICD-10-CM

## 2020-08-11 DIAGNOSIS — M17.11 PRIMARY OSTEOARTHRITIS OF RIGHT KNEE: Primary | ICD-10-CM

## 2020-08-11 RX ORDER — TRIAMCINOLONE ACETONIDE 1 MG/G
15 CREAM TOPICAL 2 TIMES DAILY
Qty: 15 G | Refills: 0 | Status: SHIPPED | OUTPATIENT
Start: 2020-08-11 | End: 2021-06-21 | Stop reason: ALTCHOICE

## 2020-08-11 RX ORDER — METHYLPREDNISOLONE 4 MG/1
TABLET ORAL
Qty: 1 DOSE PACK | Refills: 0 | Status: SHIPPED | OUTPATIENT
Start: 2020-08-11 | End: 2021-06-21 | Stop reason: ALTCHOICE

## 2020-08-13 ENCOUNTER — VIRTUAL VISIT (OUTPATIENT)
Dept: ORTHOPEDIC SURGERY | Age: 70
End: 2020-08-13
Payer: MEDICARE

## 2020-08-13 DIAGNOSIS — G89.29 CHRONIC PAIN OF RIGHT KNEE: ICD-10-CM

## 2020-08-13 DIAGNOSIS — M17.12 PRIMARY OSTEOARTHRITIS OF LEFT KNEE: ICD-10-CM

## 2020-08-13 DIAGNOSIS — M25.561 CHRONIC PAIN OF RIGHT KNEE: ICD-10-CM

## 2020-08-13 DIAGNOSIS — E66.01 SEVERE OBESITY (HCC): ICD-10-CM

## 2020-08-13 DIAGNOSIS — Z96.651 HISTORY OF TOTAL RIGHT KNEE REPLACEMENT: Primary | ICD-10-CM

## 2020-08-13 PROCEDURE — 99024 POSTOP FOLLOW-UP VISIT: CPT | Performed by: PHYSICIAN ASSISTANT

## 2020-08-13 NOTE — PROGRESS NOTES
[facility]  General Follow up    Subjective:    Jude Nunez is a 79 y.o. female presents for follow up after  Left tka on 3 August 2020. I had the pleasure of seeing her on 7 August 2020. At that time, she was struggling a bit and being less than compliant. At that time,The patient I had a graciela discussion with regard to the importance of compliance following surgery. She understood that poor compliance could lead to poor outcomes, infection, bleeding, loss of limb, failure or infection of implant, blood clot, and or death. She was given detailed instructions with how to proceed and stated that she will plan to do so. I gave her prescription for Dilaudid and have refilled that rx since last being see,  and counseled her with regard to the fact that she will not be able to drive while she is on any pain medication or any pain. She was walking so poorly without any assistive devices that I stressed the importance of obtaining a walker and I gave her another prescription to facilitate this. Signs of worsening of her condition were reviewed and should they occur she was instructed to make me aware. I also gave her a prescription for Keflex With regard to the minor erythema around her wound. Although I had a low index of suspicion, due to the fact that she is not be local so I wouldn't be able to assess the wound quickly I prophylactically provided her with antibiotics. She reports today, via Virtual Visit on [employer] while at home. ROS  Patient is a pleasant appearing individual, appropriately dressed, well hydrated, well nourished, who is alert, appropriately oriented for age, and in no acute distress with a normal gait and normal affect who does not appear to be in any significant pain.      Objective:     Vital signs stable, afebrile    Physical Exam performed visually  Right knee - Neurovascularly intact with good cap refill, full range of motion and full strength, well healing incision noted, no swelling, some erythema in the shape of her previous dressing around the wound itself plus she has a maculopapular rash on the involved leg as well as her opposite leg and trunk    Left knee - Decrease range of motion with flexion, Grossly neurovascularly intact, Good cap refill, No skin lesion, no swelling,    Assessment:     History of total right knee replacement [Z96.651]   Problem List Items Addressed This Visit        Other    Severe obesity ([address]      Other Visit Diagnoses     History of total right knee replacement    -  Primary    Chronic pain of right knee        Primary osteoarthritis of left knee             Doing well overall. Ms. Leidy Contreras has a reminder for a \"due or due soon\" health maintenance. I have asked that she contact her primary care provider for follow-up on this health maintenance. Plan:   Once again, the patient is seen virtually while at home in [place]. She sounds as though she is doing much better than when I have seen her last.  She does have a local physician who can provide her an pain medications as needed. She continues to have some discomfort in the affected leg. She is attending physical therapy regularly and has started both using a cane and wearing her stocking as recommended. She continues her blood thinning medication, aspirin. Instructions given her last visit were reinforced. We will follow-up in 3 weeks time to assess progression. Of note the patient has a history of various sensitivities and has broken out in a rash specifically around the wound. Overnight it spread to her trunk and other leg. There does not appear to be any type of induration or fluctuance. There is no drainage. I was able to visualize the wound over the Internet. A local doctor is provided her with a steroid Dosepak for the rash. She states this is not atypical for her. She denies any fevers chills. There is no increase in pain in her knee.   There is no swelling. It appears as though the redness around the wound itself is related to skin irritation likely caused by the dressing that was removed at the time of her last visit. Questions were solicited and answered to the patient's satisfaction and the patient will follow-up as discussed.       Signed By: Yolette Antoine PA-C     August 13, 2020

## 2020-09-09 ENCOUNTER — VIRTUAL VISIT (OUTPATIENT)
Dept: ORTHOPEDIC SURGERY | Age: 70
End: 2020-09-09
Payer: MEDICARE

## 2020-09-09 DIAGNOSIS — Z96.651 HISTORY OF TOTAL RIGHT KNEE REPLACEMENT: Primary | ICD-10-CM

## 2020-09-09 DIAGNOSIS — E66.01 SEVERE OBESITY (HCC): ICD-10-CM

## 2020-09-09 DIAGNOSIS — G89.29 CHRONIC PAIN OF RIGHT KNEE: ICD-10-CM

## 2020-09-09 DIAGNOSIS — M17.12 PRIMARY OSTEOARTHRITIS OF LEFT KNEE: ICD-10-CM

## 2020-09-09 DIAGNOSIS — M25.561 CHRONIC PAIN OF RIGHT KNEE: ICD-10-CM

## 2020-09-09 PROCEDURE — 99024 POSTOP FOLLOW-UP VISIT: CPT | Performed by: PHYSICIAN ASSISTANT

## 2020-09-09 NOTE — PROGRESS NOTES
[facility]  General Follow up    Subjective:    Kacy Alaniz is a 79 y.o. female presents for follow up after left total knee arthroplasty on 3 August 2020. Pain is now generally well controlled, although it has been quite difficult to control after surgery. Patient is generally without complaint. This visit took place virtually via [email or web address] me. The patient was at home and the interaction took approximately 10 minutes. ROS  Patient is a pleasant appearing individual, appropriately dressed, well hydrated, well nourished, who is alert, appropriately oriented for age, and in no acute distress with a normal gait and normal affect who does not appear to be in any significant pain. Objective:     VSS AFEB     Right knee - Neurovascularly intact with good cap refill, full range of motion and full strength, well healed incision noted, no swelling, no erythema, no instability.      Left knee - Decreased range of motion with flexion, no crepitation, Grossly neurovascularly intact, Good cap refill, wound clean, dry, and intact without evidence of drainage, induration, or fluctuance, Moderate swelling, No gross instability, Some quadriceps weakness. Some ecchymosis with erythema, but no out right warmth, or signs of infection. Assessment:     History of total right knee replacement [Z96.651]   Problem List Items Addressed This Visit        Other    Severe obesity ([address]      Other Visit Diagnoses     History of total right knee replacement    -  Primary    Chronic pain of right knee        Primary osteoarthritis of left knee             Doing well overall. Ms. Bul Dozier has a reminder for a \"due or due soon\" health maintenance. I have asked that she contact her primary care provider for follow-up on this health maintenance. Plan:     --Patient doing well postoperatively. Continue postoperative care plan and activities of daily living.   Patient will follow-up yearly with Dr. August Gave with x-rays to assess progression. The patient lives in [place]. I have recommended that she call us several weeks prior to her yearly visit and she can have x-rays done locally and we can follow-up with her with a virtual visit. She is agreeable with this plan we will therefore proceed as discussed. Questions were solicited and answered to the patient's satisfaction and the patient will follow-up as discussed.       Signed By: Lily Cuellar PA-C     September 9, 2020    '

## 2020-09-17 ENCOUNTER — HOSPITAL ENCOUNTER (OUTPATIENT)
Dept: HOSPITAL 61 - PNCL | Age: 70
Discharge: HOME | End: 2020-09-17
Attending: ANESTHESIOLOGY
Payer: MEDICARE

## 2020-09-17 DIAGNOSIS — M54.5: Primary | ICD-10-CM

## 2020-09-17 DIAGNOSIS — H91.8X9: ICD-10-CM

## 2020-09-17 DIAGNOSIS — K58.8: ICD-10-CM

## 2020-09-17 DIAGNOSIS — Z87.440: ICD-10-CM

## 2020-09-17 DIAGNOSIS — I10: ICD-10-CM

## 2020-09-17 DIAGNOSIS — M79.662: ICD-10-CM

## 2020-09-17 DIAGNOSIS — M19.90: ICD-10-CM

## 2020-09-17 DIAGNOSIS — Z79.899: ICD-10-CM

## 2020-09-17 DIAGNOSIS — Z79.82: ICD-10-CM

## 2020-09-17 DIAGNOSIS — Z98.890: ICD-10-CM

## 2020-09-17 DIAGNOSIS — K21.9: ICD-10-CM

## 2020-09-17 DIAGNOSIS — Z98.51: ICD-10-CM

## 2020-09-17 DIAGNOSIS — Z96.653: ICD-10-CM

## 2020-09-17 PROCEDURE — G0260 INJ FOR SACROILIAC JT ANESTH: HCPCS

## 2020-09-17 PROCEDURE — 27096 INJECT SACROILIAC JOINT: CPT

## 2020-09-17 NOTE — PDOC2
INITIAL PAIN CONSULT


DATE OF SERVICE:


DOS:


DATE: 9/17/20 


TIME: 13:17





CHIEF COMPLAINT:


Chief Complaint:


Low back and left lower extremity pain





HISTORY OF PRESENT ILLNESS:


70-year-old female presents with history of pain for about 3 months without any 

specific injury or accident that she is aware of, with pain in the low back left

side of the posterior hip into the posterior thigh and calf and into the heel on

the left side as well as the plantar surface of the foot.  Patient reports it is

worse with walking ,standing ,changing positions from standing to sitting and 

vice versa.  Patient describes pain is constant sharp stabbing at times dull and

aching in the low back rating the posterior gluteus posterior thigh and some in 

the heel of the foot on the left side as well.  Patient reports awakens her from

sleep about 2-3 times a night.  Patient reports it does not affect her bowel or 

bladder control without any incontinence but does affect her ability to walk 

occasionally but not use any assistive devices to ambulate.  Patient is tried ph

ysical therapy chiropractic treatment with exercise which she is doing currently

as well as acupuncture and dry needling all which were temporarily helpful but 

not long-term.  Patient taking ibuprofen which helps to some extent as well but 

only mildly.  Patient rates her disability rating 0-10 10 being the worst as a 7

with family home responsibilities for with recreation 5 with social activity 6 

with occupation and sexual behavior 3 with self-care and life support 

activities.  Patient reports no loss of motor function but significant 

fatigability of the left lower extremity with walking even with standing for 

more than about 10 to 15 minutes.





PAST MEDICAL HISTORY:


PMH:


Hearing loss, hypertension, arthritis, irritable bowel syndrome, 

gastroesophageal reflux, recurrent urinary tract infections.





PREVIOUS SURGERIES:


Past Surgical Hx:


Bladder sling, tonsillectomy adenoidectomy, ORIF humerus, bilateral knee 

replacement right side May 28 and left side August 30, 2020, tubal ligation





FAMILY HISTORY:


Family Hx:


Cancer, heart disease, strokes





SOCIAL HISTORY:


Social Hx:


Patient drinks 1 glass of wine only very rarely maybe once a month does not 

smoke does not use any illegal illicit or recreational drugs is  lives 

with her spouse lives locally in [facility] reports she is currently 

retired.





REVIEW OF SYSTEMS:


ROS:


Positive for those items mentioned in history of present illness, all systems 

are reviewed, otherwise negative, is complete full and well-documented on [facility]'s chart





PHYSICAL EXAM:


VS:


Blood pressure is 146/84 pulse 69 respiration 16 temperature 97.9 F height is 5

feet 4 inches weight 217 pounds


PE:


PHYSICAL EXAMINATION:





GENERAL: The patient is awake, alert, oriented, appropriate, very pleasant 

demeanor


HEENT: Shows normocephalic, atraumatic.  Extraocular movements are intact and 

symmetrical.  Oral cavity: Mucous membranes moist and pink.


NECK: Shows anterior throat supple without palpable lymphadenopathy noted.  

Swallow reflex symmetrical.


CHEST: Shows normal on inspection.  Breath sounds are clear bilaterally, no 

rales rhonchi or wheezes auscultated.


HEART: Shows S1, S2 clear.  No murmurs auscultated.


ABDOMEN: Soft, nontender, nondistended.  No palpable organomegaly is noted.  No 

rebound or guarding demonstrated.


BACK: Shows spine grossly in the midline.  Normal-appearing cervical lordotic 

curvature.  There is slightly increased thoracic kyphosis, some minor flattening

of the lumbar lordotic curvature.  Lumbar paraspinous muscles show symmetrical 

on inspection, on palpation shows some moderate tenderness diffusely throughout 

the upper, middle and lower distribution of the paraspinous muscles bilaterally,

but without specific trigger points, without radiation of pain.  The patient has

good rotational motion of the lumbar spine, both laterally as well as extension 

and flexion without significant difficulty.  No tenderness over the spinous 

processes, or sacrum.  Sacroiliac regions show significant tenderness with 

palpation over the left posterior superior iliac spine but not the right also 

significant tenderness with palpation over the sacroiliac joint itself on the 

left side with patient withdrawing from the examining hand secondary to 

significant tenderness over this region.


EXTREMITIES: Lower extremities show deep tendon reflexes 2+ in the patellar and 

tendo calcaneus tendons.  Motor exam is 5 on a scale of 5 with right 

dorsiflexion, extension, quadriceps and hamstring flexion and 5/5 on the left.  

Peripheral pulses are 1+ posterior tibial.  No peripheral edema is noted 

bilaterally.  Lower extremities are warm and dry to touch, equal in color and 

appearance.  Straight leg raise noted to be negative on the right; left side is 

mildly positive at about 45 degrees decreased with knee flexion.  Gaenslen's 

maneuvers shows positive on the left side with external rotation and posterior 

displacement of the lower leg right side is negative.  Chaka's maneuvers are 

negative bilaterally.  The patient is able to stand, stand on her toes without 

significant difficulty or loss of balance walks with a normal-appearing gait 

does not appear to favor the right or left lower extremity significantly not 

using any assistive device such as canes or walkers to ambulate.


SKIN: Shows warm and dry, good turgor.  No edema.  No sores, rashes or bruising 

throughout.





IMPRESSION:


Impression:


70-year-old female with approximate 3-month history increasing pain low back 

left hip and lower extremity


Hypertension


Arthritis


Hearing loss


Recent bilateral knee replacements





Plan: Options were discussed with the patient patient spouse who accompanied her

visit today, we will proceed with a left sacroiliac joint injection with 

fluoroscopic guidance today also will order MRI scan of the lumbar spine to 

better differentiate the radicular pattern pain she has in the left leg.  Risks 

were discussed including but not limited to bleeding infection possibility of 

intravascular injection sequelae spread of local anesthetic and numbness side 

effects of steroid medication exposure fluoroscopy and poor results rating pain 

control.  Patient understands wished to proceed.  Patient return to clinic in 

possibly 2 weeks for follow-up.





Under sterile prep and drape using C-arm fluoroscopic guidance, left sacroiliac 

joint injected with 22-gauge Quincke needle with stylette, using 3 cc 0.25% 

bupivacaine +80 mg Depo-Medrol +2 cc contrast after negative aspiration.  

Condition at discharge is stable, patient tolerated procedure well and had no 

complications.











NATALIE ARIZA MD               Sep 17, 2020 13:26

## 2020-09-21 ENCOUNTER — HOSPITAL ENCOUNTER (OUTPATIENT)
Dept: HOSPITAL 61 - KCIC MRI | Age: 70
End: 2020-09-21
Attending: ANESTHESIOLOGY
Payer: MEDICARE

## 2020-09-21 DIAGNOSIS — M51.37: ICD-10-CM

## 2020-09-21 DIAGNOSIS — M54.16: Primary | ICD-10-CM

## 2020-09-21 DIAGNOSIS — M48.061: ICD-10-CM

## 2020-09-21 DIAGNOSIS — M54.5: ICD-10-CM

## 2020-09-21 DIAGNOSIS — M41.86: ICD-10-CM

## 2020-09-21 DIAGNOSIS — M25.552: ICD-10-CM

## 2020-09-21 PROCEDURE — 72148 MRI LUMBAR SPINE W/O DYE: CPT

## 2020-09-21 NOTE — KCIC
MRI Lumbar Spine without contrast

 

History: Lumbar radiculopathy, progressive low back pain into the left hip

in recent months

 

Technique: Multiplanar, multi sequential noncontrast MR imaging was 

performed of the lumbar spine.

 

Comparison: None

 

Findings: There is edema of the sacrum bilaterally.

 

Lumbar vertebral body stature is maintained. There is very mild grade 1 

anterior spondylolisthesis L4-5 and L5-S1. It is difficult to confirm 

integrity of the right L5 pars interarticularis on this exam, believed to 

be intact on the left. There is moderate L5-S1 degenerative disc disease, 

minimally at L4-5 and L2-3, mild disc desiccation L3-4 and L1-2. There are

anterior annular tears L1-L2 through L3-4. Conus terminates at L1. There 

is mild dextroscoliosis centered near L3. There is very mild right lateral

subluxation L4 relative to L5.

 

T12-L1: There is negligible posterior protrusion. Neural foramina and 

spinal canal are adequate.

 

L1-L2:  There is very minimal disc osteophyte complex, also very shallow 

protrusion in the right lateral recess. Spinal canal and neural foramina 

are adequate.

 

L2-L3:  There is very minimal disc osteophyte complex and bulge. There is 

mild facet degenerative change and buckling of the ligamentum flavum. 

Spinal canal and neural foramina are adequate.

 

L3-L4:  There is mild facet hypertrophic change and buckling of the 

ligamentum flavum. There is negligible disc osteophyte complex. Spinal 

canal and neural foramina are adequate.

 

L4-L5:  There is moderate bilateral facet hypertrophic change and buckling

of the ligamentum flavum fluid in the facet articulations bilaterally. 

There is moderate left and mild right lateral recess stenosis, mild 

narrowing of the central canal. There is mild narrowing of the right 

neural foramen primarily from posteriorly by facet, left neural foramen 

adequate.

 

L5-S1:  There is mild to moderate left facet degenerative change, minimal 

fluid in the left facet articulation. There is minimal disc osteophyte 

complex, very shallow superimposed protrusion without significant 

impingement of the descending S1 nerve roots. Spinal canal is overall 

adequate. Left neural foramen is adequate. There is mild narrowing of the 

right neural foramen, right posterolateral disc osteophyte complex also 

contacting the extraforaminal right L5 nerve root.

 

Impression: 

 

1. Bilateral sacral edema is likely sequela of recent insufficiency 

fractures.

2. There is moderate L5-S1 degenerative disc disease, minimally at other 

levels. There is mild spondylosis. There is moderate left and mild right 

lateral recess stenosis at L4-5. There is mild right neural foramina 

compromise at L4-5 and L5-S1, also contact of the extraforaminal right L5 

nerve root at L5-S1 by disc osteophyte complex.

3. There is mild lumbar dextroscoliosis. There is mild grade 1 anterior 

spondylolisthesis at L4-5 and L5-S1, multilevel lumbar facet degenerative 

change.

 

Electronically signed by: Toni Estevez MD (9/21/2020 12:41 PM) 

[id number]

## 2020-10-01 ENCOUNTER — HOSPITAL ENCOUNTER (OUTPATIENT)
Dept: HOSPITAL 61 - PNCL | Age: 70
Discharge: HOME | End: 2020-10-01
Attending: ANESTHESIOLOGY
Payer: MEDICARE

## 2020-10-01 DIAGNOSIS — M51.16: ICD-10-CM

## 2020-10-01 DIAGNOSIS — Z79.899: ICD-10-CM

## 2020-10-01 DIAGNOSIS — I10: ICD-10-CM

## 2020-10-01 DIAGNOSIS — Z98.51: ICD-10-CM

## 2020-10-01 DIAGNOSIS — K21.9: ICD-10-CM

## 2020-10-01 DIAGNOSIS — M46.1: Primary | ICD-10-CM

## 2020-10-01 PROCEDURE — G0463 HOSPITAL OUTPT CLINIC VISIT: HCPCS

## 2020-10-01 NOTE — PDOC
Progress Note - Pain Clinic


Date of Service:


DOS:


DATE: 10/1/20 


TIME: 10:23





Diagnosis:


Dx:


Left sacroiliitis


Lumbar radiculopathy with lumbar degenerative disc disease





History or Present Illness:


HPI:


70-year-old female returns follow-up status post left sacroiliac joint injection

x1.  Patient reports about 80% improvement and still doing better but not quite 

80% now about 70% after her injection on September 17, 2020 patient reports is 

been increase activity degrees and comfort travel with greater ease walking 

greater distances and comfort sleeping better at night patient reports her pain 

is beginning to return on the left side also radiating to the left lower 

extremity in the posterior gluteus and thigh and occasionally in the left calf 

but only very occasionally patient reports the pain is a 3 on scale 10 is worse 

over the past week 3 on average 3 days least is a 3 today patient scribes pain 

is sharp and aching shooting in the left leg at times mostly in the left 

posterior hip and gluteus.  Patient reports he did have some redness and bumps 

on the skin after her last injection and her blood pressure was elevated 260/93 

after the injection as well she is somewhat apprehensive about having any other 

procedures at this time.  Patient reports otherwise no new motor or sensory 

deficits no new changes





Physical Exam:


VS:


Blood pressure is 121/77 pulse 74 respirations 20 temperature 98.0 F height is 

216 pounds


PE:


PHYSICAL EXAMINATION:





GENERAL: The patient is awake, alert, oriented, appropriate, very pleasant 

demeanor


HEENT: Shows normocephalic, atraumatic.  Extraocular movements are intact and 

symmetrical.  


NECK: Shows anterior throat supple without palpable lymphadenopathy noted.  

Swallow reflex symmetrical.


CHEST: Shows normal on inspection.  Breath sounds are clear bilaterally.


HEART: Shows S1, S2 clear.  No murmurs auscultated.


ABDOMEN: Soft, nontender, nondistended.  No palpable organomegaly is noted.  No 

rebound or guarding demonstrated.


BACK: Shows spine grossly in the midline.  Normal-appearing cervical lordotic 

curvature.  There is slightly increased thoracic kyphosis, some minor flattening

of the lumbar lordotic curvature, patient shows good rotation motion lumbar 

spine both laterally as well as extension flexion without significant increase 

in pain.  Lumbar paraspinous muscles show symmetrical on inspection, on 

palpation shows some moderate tenderness diffusely throughout the upper, middle 

and lower distribution of the paraspinous muscles bilaterally without specific 

trigger points, without radiation of pain.  The patient has good rotational 

motion of the lumbar spine, both laterally as well as extension and flexion 

without significant difficulty.  No tenderness over the spinous processes, 

sacrum or sacroiliac regions.


EXTREMITIES: Lower extremities show deep tendon reflexes 2+ in the patellar and 

tendo calcaneus tendons.  Motor exam is 5 on a scale of 5 with right 

dorsiflexion, extension, quadriceps and hamstring flexion and 5/5 on the left.  

Peripheral pulses are 1+ posterior tibial.  No peripheral edema is noted 

bilaterally.  Lower extremities are warm and dry to touch, equal in color and 

appearance.  Patient has mild tenderness with palpation over the posterior 

superior iliac spine on the left but not the right also in the left sacroiliac 

region but only very mild pain.  Gaenslen's maneuver is negative bilaterally.


SKIN: Shows warm and dry, good turgor.  No edema.  No sores, rashes or bruising 

throughout.





Procedure:


Procedure:


Options discussed with the patient.  Patients chart was reviewed,her current 

medication regimen updated current review of systems updated today as well.  We 

will wait on any further injections at this time as patient is somewhat 

apprehensive about the blood pressure response she had after her last injection.

 Patient will return to clinic in approximate 1 month as scheduled or sooner if 

necessary the pain returns.  We also discussed patient's MRI scan report showing

some narrowing at the L4-5 and L5-S1 levels in the lumbar spine.  Patient will 

return to clinic in approximate 1 month and will reassess her pain situation at 

that time.





Medication Injected:


Med Injected:


None





Condition at Discharge:


Condition at Discharge:


Condition at discharge is stable











NATALIE ARIZA MD                Oct 1, 2020 10:27

## 2020-10-06 ENCOUNTER — HOSPITAL ENCOUNTER (EMERGENCY)
Dept: HOSPITAL 63 - ER | Age: 70
LOS: 1 days | Discharge: ANOTHER HOSPITAL | End: 2020-10-07
Payer: MEDICARE

## 2020-10-06 VITALS — BODY MASS INDEX: 36.96 KG/M2 | WEIGHT: 216.49 LBS | HEIGHT: 64 IN

## 2020-10-06 VITALS
SYSTOLIC BLOOD PRESSURE: 142 MMHG | DIASTOLIC BLOOD PRESSURE: 84 MMHG | SYSTOLIC BLOOD PRESSURE: 142 MMHG | DIASTOLIC BLOOD PRESSURE: 84 MMHG | DIASTOLIC BLOOD PRESSURE: 84 MMHG | DIASTOLIC BLOOD PRESSURE: 84 MMHG | DIASTOLIC BLOOD PRESSURE: 84 MMHG | DIASTOLIC BLOOD PRESSURE: 84 MMHG | SYSTOLIC BLOOD PRESSURE: 142 MMHG | SYSTOLIC BLOOD PRESSURE: 142 MMHG | SYSTOLIC BLOOD PRESSURE: 142 MMHG | DIASTOLIC BLOOD PRESSURE: 84 MMHG | SYSTOLIC BLOOD PRESSURE: 142 MMHG | SYSTOLIC BLOOD PRESSURE: 142 MMHG | SYSTOLIC BLOOD PRESSURE: 142 MMHG | DIASTOLIC BLOOD PRESSURE: 84 MMHG

## 2020-10-06 DIAGNOSIS — I10: ICD-10-CM

## 2020-10-06 DIAGNOSIS — N13.2: ICD-10-CM

## 2020-10-06 DIAGNOSIS — Z88.2: ICD-10-CM

## 2020-10-06 DIAGNOSIS — K21.9: ICD-10-CM

## 2020-10-06 DIAGNOSIS — Z20.828: ICD-10-CM

## 2020-10-06 DIAGNOSIS — N12: Primary | ICD-10-CM

## 2020-10-06 DIAGNOSIS — M19.90: ICD-10-CM

## 2020-10-06 DIAGNOSIS — E03.9: ICD-10-CM

## 2020-10-06 LAB
ANION GAP SERPL CALC-SCNC: 10 MMOL/L (ref 6–14)
APTT PPP: YELLOW S
BACTERIA #/AREA URNS HPF: 0 /HPF
BASOPHILS # BLD AUTO: 0.1 X10^3/UL (ref 0–0.2)
BASOPHILS NFR BLD: 1 % (ref 0–3)
BILIRUB UR QL STRIP: (no result)
CA-I SERPL ISE-MCNC: 16 MG/DL (ref 7–20)
CALCIUM SERPL-MCNC: 9.7 MG/DL (ref 8.5–10.1)
CHLORIDE SERPL-SCNC: 101 MMOL/L (ref 98–107)
CO2 SERPL-SCNC: 26 MMOL/L (ref 21–32)
CREAT SERPL-MCNC: 1 MG/DL (ref 0.6–1)
EOSINOPHIL NFR BLD: 0 % (ref 0–3)
EOSINOPHIL NFR BLD: 0 X10^3/UL (ref 0–0.7)
ERYTHROCYTE [DISTWIDTH] IN BLOOD BY AUTOMATED COUNT: 13.9 % (ref 11.5–14.5)
FIBRINOGEN PPP-MCNC: (no result) MG/DL
GFR SERPLBLD BASED ON 1.73 SQ M-ARVRAT: 54.8 ML/MIN
GLUCOSE SERPL-MCNC: 124 MG/DL (ref 70–99)
GLUCOSE UR STRIP-MCNC: (no result) MG/DL
HCT VFR BLD CALC: 36.3 % (ref 36–47)
HGB BLD-MCNC: 12 G/DL (ref 12–15.5)
INFLUENZA A PATIENT: NEGATIVE
INFLUENZA B PATIENT: NEGATIVE
LYMPHOCYTES # BLD: 0.5 X10^3/UL (ref 1–4.8)
LYMPHOCYTES NFR BLD AUTO: 4 % (ref 24–48)
MCH RBC QN AUTO: 29 PG (ref 25–35)
MCHC RBC AUTO-ENTMCNC: 33 G/DL (ref 31–37)
MCV RBC AUTO: 89 FL (ref 79–100)
MONO #: 0.6 X10^3/UL (ref 0–1.1)
MONOCYTES NFR BLD: 5 % (ref 0–9)
NEUT #: 10.5 X10^3UL (ref 1.8–7.7)
NEUTROPHILS NFR BLD AUTO: 90 % (ref 31–73)
NITRITE UR QL STRIP: (no result)
PLATELET # BLD AUTO: 248 X10^3/UL (ref 140–400)
POTASSIUM SERPL-SCNC: 3.3 MMOL/L (ref 3.5–5.1)
RBC # BLD AUTO: 4.09 X10^6/UL (ref 3.5–5.4)
RBC #/AREA URNS HPF: (no result) /HPF (ref 0–2)
SODIUM SERPL-SCNC: 137 MMOL/L (ref 136–145)
SP GR UR STRIP: 1.01
SQUAMOUS #/AREA URNS LPF: (no result) /LPF
UROBILINOGEN UR-MCNC: 0.2 MG/DL
WBC # BLD AUTO: 11.6 X10^3/UL (ref 4–11)
WBC #/AREA URNS HPF: >40 /HPF (ref 0–4)

## 2020-10-06 PROCEDURE — 81001 URINALYSIS AUTO W/SCOPE: CPT

## 2020-10-06 PROCEDURE — 36415 COLL VENOUS BLD VENIPUNCTURE: CPT

## 2020-10-06 PROCEDURE — 96375 TX/PRO/DX INJ NEW DRUG ADDON: CPT

## 2020-10-06 PROCEDURE — 71275 CT ANGIOGRAPHY CHEST: CPT

## 2020-10-06 PROCEDURE — 71045 X-RAY EXAM CHEST 1 VIEW: CPT

## 2020-10-06 PROCEDURE — 83605 ASSAY OF LACTIC ACID: CPT

## 2020-10-06 PROCEDURE — 83880 ASSAY OF NATRIURETIC PEPTIDE: CPT

## 2020-10-06 PROCEDURE — 80048 BASIC METABOLIC PNL TOTAL CA: CPT

## 2020-10-06 PROCEDURE — 84484 ASSAY OF TROPONIN QUANT: CPT

## 2020-10-06 PROCEDURE — 74177 CT ABD & PELVIS W/CONTRAST: CPT

## 2020-10-06 PROCEDURE — 96361 HYDRATE IV INFUSION ADD-ON: CPT

## 2020-10-06 PROCEDURE — 85379 FIBRIN DEGRADATION QUANT: CPT

## 2020-10-06 PROCEDURE — 96376 TX/PRO/DX INJ SAME DRUG ADON: CPT

## 2020-10-06 PROCEDURE — 96365 THER/PROPH/DIAG IV INF INIT: CPT

## 2020-10-06 PROCEDURE — 85610 PROTHROMBIN TIME: CPT

## 2020-10-06 PROCEDURE — 87804 INFLUENZA ASSAY W/OPTIC: CPT

## 2020-10-06 PROCEDURE — 87040 BLOOD CULTURE FOR BACTERIA: CPT

## 2020-10-06 PROCEDURE — 99285 EMERGENCY DEPT VISIT HI MDM: CPT

## 2020-10-06 PROCEDURE — 85730 THROMBOPLASTIN TIME PARTIAL: CPT

## 2020-10-06 PROCEDURE — 93005 ELECTROCARDIOGRAM TRACING: CPT

## 2020-10-06 PROCEDURE — 85025 COMPLETE CBC W/AUTO DIFF WBC: CPT

## 2020-10-06 PROCEDURE — 87205 SMEAR GRAM STAIN: CPT

## 2020-10-06 NOTE — RAD
CT ANGIO CHEST W ABD PEL W/ 

 

Indication: Hypoxia, frequent urination, nausea

 

Technique: Postcontrast CT imaging was performed of the chest, abdomen, 

pelvis, multiplanar reconstruction images to include MIP reconstruction 

images are submitted. No oral contrast was given. One or more of the 

following individualized dose reduction techniques were utilized for this 

examination:  1. Automated exposure control  2. Adjustment of the mA 

and/or kV according to patient size  3. Use of iterative reconstruction 

technique.

 

Comparison: October 1, 2009

 

CHEST:

 

Findings:  

There is some motion degradation. No convincing pulmonary embolism is 

identified. There is prominent coronary calcification. There is somewhat 

ectatic tubular ascending thoracic aorta about 3.7 cm. No dissection flap 

is identified of the thoracic aorta. No significantly enlarged nodes 

identified of the chest. There is no pleural or pericardial fluid, 

pneumothorax, or significant infiltrate. There is mild-to-moderate reverse

S-shaped curvature of the mid to superior thoracic spine. There is a small

hiatal hernia. There is degenerative change of the glenohumeral 

articulations bilaterally.

 

IMPRESSION:

1.  No convincing pulmonary embolism is identified there is no significant

infiltrate or pleural fluid.

2. There is coronary calcification.

3. There is reverse S shaped scoliotic curvature of the thoracic spine.

4. There is small hiatal hernia.

 

Abdomen pelvis

 

FINDINGS: There is very mild left hydronephrosis, 0.5 to 0.6 cm calculus 

in the proximal left ureter just beyond the ureteropelvic junction. There 

is mild enhancement of the walls of the proximal left ureter as well as 

the left renal collecting system. Both kidneys enhance. There is no right 

hydronephrosis. There is a small focus of gas in the urinary bladder 

lumen.

 

Accurate evaluation of bowel is limited without oral contrast. Bowel is 

not dilated. There is no free fluid or free air. There is mild colonic 

diverticulosis greatest of the sigmoid colon, no significant adjacent 

inflammatory change. Normal caliber appendix is visualized without 

adjacent inflammatory change. There is mild scattered plaque of the 

abdominal aorta and iliac arteries. Abdominal aortic caliber is within 

normal limits. Celiac and superior mesenteric arteries are patent. There 

is some calcified plaque near the orifice of the right renal artery 

without significant focal stenosis. No focal abnormality is identified of 

the spleen. There is a small accessory spleen. No focal abnormality is 

identified of the liver or the pancreas. There is elongation of the right 

lobe of the liver. There is no significant adrenal nodularity. Gallbladder

is present without obvious intraluminal abnormality by CT. There is very 

mild grade 1 anterior spondylolisthesis of L4-5 and L5-S1, multilevel 

lumbar facet degenerative change. There is variable multilevel lumbar 

degenerative disc disease. There is mild reverse S shaped curvature of the

lumbar spine. There is suspected moderate to severe spinal stenosis at 

L4-5.

 

IMPRESSION:

 

1.There is mild left hydroureteronephrosis due to 0.5 to 0.6 cm calculus 

in the proximal left ureter just beyond the ureteropelvic junction. There 

is some enhancement of the walls of the proximal left ureter as well as 

left left renal collecting system as may be seen with inflammatory 

change/ureteritis.

2. There is mild colonic diverticulosis.

3. Small focus of gas in the urinary bladder lumen could be due to recent 

instrumentation if corresponding history. If there has not been recent 

catheterization, infection with gas-forming organism is not excluded. 

Sequela of fistula seems less likely.

4. There is likely moderate to severe spinal stenosis at L4-5.

 

Electronically signed by: Toni Estevez MD (10/6/2020 6:58 PM) [facility]

## 2020-10-06 NOTE — PHYS DOC
Past History


Past Medical History:  Arthritis, GERD, Hypertension, Hypothyroid


Past Surgical History:  Tonsillectomy, Tubal ligation, Other


Alcohol Use:  None


Drug Use:  None





General Adult


EDM:


Chief Complaint:  FATIGUE





HPI:


HPI:





Patient is a 70-year-old female coming for multiple complaints including 

headache, urinary frequency, body aches, chills, chest "soreness" and, shortness

of breath.  She has had some nausea but no vomiting, denies diarrhea.  Took 

ibuprofen this morning with improvement.  Has past medical history of GERD, 

hypertension, hypothyroid.  She has been compliant on medications





Review of Systems:


Review of Systems:


Constitutional: Fever and chills, body aches


Eyes:  Denies change in visual acuity 


HENT:  Denies nasal congestion or sore throat 


Respiratory: Shortness of breath


Cardiovascular: Chest soreness denies peripheral extremity edema


GI: Nausea without vomiting, denies diarrhea or abdominal pain


: Denies dysuria but has had urinary frequency


Musculoskeletal:  Denies back pain or joint pain 


Integument:  Denies rash 


Neurologic: Frontal headache without focal weakness or sensory changes 


Endocrine:  Denies polyuria or polydipsia 


Lymphatic:  Denies swollen glands 


Psychiatric:  Denies depression or anxiety





Heart Score:


Risk Factors:


Risk Factors:  DM, Current or recent (<one month) smoker, HTN, HLP, family 

history of CAD, obesity.


Risk Scores:


Score 0 - 3:  2.5% MACE over next 6 weeks - Discharge Home


Score 4 - 6:  20.3% MACE over next 6 weeks - Admit for Clinical Observation


Score 7 - 10:  72.7% MACE over next 6 weeks - Early Invasive Strategies





Allergies:


Allergies:





Allergies








Coded Allergies Type Severity Reaction Last Updated Verified


 


  Sulfa (Sulfonamide Antibiotics) Allergy Severe Swelling 9/4/14 No











Physical Exam:


PE:





Constitutional: Well developed, well nourished, no acute distress, non-toxic 

appearance. []


HENT: Normocephalic, atraumatic, bilateral external ears normal, oropharynx 

moist, no oral exudates, nose normal. []


Eyes: PERRLA, EOMI, conjunctiva normal, no discharge. [] 


Neck: Normal range of motion, no tenderness, supple, no stridor. [] 


Cardiovascular:Heart rate regular rhythm, no murmur []


Lungs & Thorax:  Bilateral breath sounds clear to auscultation []


Abdomen: Bowel sounds normal, soft, no tenderness, no masses, no pulsatile 

masses. [] 


Skin: Warm, dry, no erythema, no rash. [] 


Back: No tenderness, no CVA tenderness. [] 


Extremities: No tenderness, no cyanosis, no clubbing, ROM intact, no edema. [] 


Neurologic: Alert and oriented X 3, normal motor function, normal sensory 

function, no focal deficits noted. []


Psychologic: Affect normal, judgement normal, mood normal. []





EKG:


EKG:


Sinus tachycardia, heart rate 102, no ectopy.  []





Radiology/Procedures:


Radiology/Procedures:


CT ANGIO CHEST W ABD PEL W/ 


 


Indication: Hypoxia, frequent urination, nausea


 


Technique: Postcontrast CT imaging was performed of the chest, abdomen, 


pelvis, multiplanar reconstruction images to include MIP reconstruction 


images are submitted. No oral contrast was given. One or more of the 


following individualized dose reduction techniques were utilized for this 


examination:  1. Automated exposure control  2. Adjustment of the mA 


and/or kV according to patient size  3. Use of iterative reconstruction 


technique.


 


Comparison: October 1, 2009


 


CHEST:


 


Findings:  


There is some motion degradation. No convincing pulmonary embolism is 


identified. There is prominent coronary calcification. There is somewhat 


ectatic tubular ascending thoracic aorta about 3.7 cm. No dissection flap 


is identified of the thoracic aorta. No significantly enlarged nodes 


identified of the chest. There is no pleural or pericardial fluid, 


pneumothorax, or significant infiltrate. There is mild-to-moderate reverse


S-shaped curvature of the mid to superior thoracic spine. There is a small


hiatal hernia. There is degenerative change of the glenohumeral 


articulations bilaterally.


 


IMPRESSION:


1.  No convincing pulmonary embolism is identified there is no significant


infiltrate or pleural fluid.


2. There is coronary calcification.


3. There is reverse S shaped scoliotic curvature of the thoracic spine.


4. There is small hiatal hernia.


 


Abdomen pelvis


 


FINDINGS: There is very mild left hydronephrosis, 0.5 to 0.6 cm calculus 


in the proximal left ureter just beyond the ureteropelvic junction. There 


is mild enhancement of the walls of the proximal left ureter as well as 


the left renal collecting system. Both kidneys enhance. There is no right 


hydronephrosis. There is a small focus of gas in the urinary bladder 


lumen.


 


Accurate evaluation of bowel is limited without oral contrast. Bowel is 


not dilated. There is no free fluid or free air. There is mild colonic 


diverticulosis greatest of the sigmoid colon, no significant adjacent 


inflammatory change. Normal caliber appendix is visualized without 


adjacent inflammatory change. There is mild scattered plaque of the 


abdominal aorta and iliac arteries. Abdominal aortic caliber is within 


normal limits. Celiac and superior mesenteric arteries are patent. There 


is some calcified plaque near the orifice of the right renal artery 


without significant focal stenosis. No focal abnormality is identified of 


the spleen. There is a small accessory spleen. No focal abnormality is 


identified of the liver or the pancreas. There is elongation of the right 


lobe of the liver. There is no significant adrenal nodularity. Gallbladder


is present without obvious intraluminal abnormality by CT. There is very 


mild grade 1 anterior spondylolisthesis of L4-5 and L5-S1, multilevel 


lumbar facet degenerative change. There is variable multilevel lumbar 


degenerative disc disease. There is mild reverse S shaped curvature of the


lumbar spine. There is suspected moderate to severe spinal stenosis at 


L4-5.


 


IMPRESSION:


 


1.There is mild left hydroureteronephrosis due to 0.5 to 0.6 cm calculus 


in the proximal left ureter just beyond the ureteropelvic junction. There 


is some enhancement of the walls of the proximal left ureter as well as 


left left renal collecting system as may be seen with inflammatory 


change/ureteritis.


2. There is mild colonic diverticulosis.


3. Small focus of gas in the urinary bladder lumen could be due to recent 


instrumentation if corresponding history. If there has not been recent 


catheterization, infection with gas-forming organism is not excluded. 


Sequela of fistula seems less likely.


4. There is likely moderate to severe spinal stenosis at L4-5.


 []





Course & Med Decision Making:


Course & Med Decision Making


Pertinent Labs and Imaging studies reviewed. (See chart for details)


Discussed with family extensively mine, will consult patient.  Pending bed at 

end of shift.


[]





Dragon Disclaimer:


Dragon Disclaimer:


This electronic medical record was generated, in whole or in part, using a voice

 recognition dictation system.





Departure


Departure:


Impression:  


   Primary Impression:  


   Renal calculus, left


   Additional Impression:  


   Pyelonephritis


Disposition:  05 TRANSFER OTHER


Condition:  STABLE


Referrals:  


BINA YEAGER MD (PCP)











GABRIEL OH MD               Oct 6, 2020 14:53

## 2020-10-06 NOTE — RAD
Examination: PORTABLE CHEST 1V

 

History:  chest pain  

 

Comparison/Correlation: 5/18/2020 two-view chest x-ray exam

 

Findings: Portable upright frontal view of the chest was obtained. Heart 

size is borderline. No acute pulmonary inflation. No pneumothorax. 

Pulmonary vasculature is within upper limits of normal. No focal 

infiltrates. No definite effusion. Spurring of the left humeral head is 

present.

 

 

Impression:

Borderline pulmonary vascular congestion is present but may be artifactual

related to limited pulmonary inflation. No focal infiltrate.

 

Electronically signed by: Troy Vela MD (10/6/2020 3:15 PM) [id number]

## 2020-10-07 NOTE — EKG
[facility] [address], [place] [address]

Test Date:    2020-10-06               Test Time:    14:35:41

Pat Name:     RODRIGUEZ REDDING           Department:   

Patient ID:   [id number]           Room:          

Gender:       F                        Technician:   

:          [id number]               Requested By: MARION CADET

Order Number: [id number]            Reading MD:     

                                 Measurements

Intervals                              Axis          

Rate:         102                      P:            15

IN:           162                      QRS:          16

QRSD:         86                       T:            26

QT:           318                                    

QTc:          418                                    

                           Interpretive Statements

SINUS TACHYCARDIA

QRS(T) CONTOUR ABNORMALITY

CONSIDER INFERIOR INFARCT

POSSIBLY ABNORMAL ECG

RI6.02

No previous ECG available for comparison

## 2020-12-09 ENCOUNTER — HOSPITAL ENCOUNTER (OUTPATIENT)
Dept: HOSPITAL 63 - LAB | Age: 70
End: 2020-12-09
Payer: MEDICARE

## 2020-12-09 DIAGNOSIS — N39.41: Primary | ICD-10-CM

## 2020-12-09 PROCEDURE — 87086 URINE CULTURE/COLONY COUNT: CPT

## 2020-12-23 ENCOUNTER — HOSPITAL ENCOUNTER (OUTPATIENT)
Dept: HOSPITAL 63 - LAB | Age: 70
End: 2020-12-23
Payer: MEDICARE

## 2020-12-23 DIAGNOSIS — N30.20: Primary | ICD-10-CM

## 2020-12-23 PROCEDURE — 87086 URINE CULTURE/COLONY COUNT: CPT

## 2021-04-14 ENCOUNTER — HOSPITAL ENCOUNTER (OUTPATIENT)
Dept: HOSPITAL 63 - DXRAD | Age: 71
End: 2021-04-14
Attending: SPECIALIST
Payer: MEDICARE

## 2021-04-14 DIAGNOSIS — M47.816: ICD-10-CM

## 2021-04-14 DIAGNOSIS — M43.8X5: ICD-10-CM

## 2021-04-14 DIAGNOSIS — N20.1: Primary | ICD-10-CM

## 2021-04-14 DIAGNOSIS — N94.89: ICD-10-CM

## 2021-04-14 PROCEDURE — 74018 RADEX ABDOMEN 1 VIEW: CPT

## 2021-04-14 NOTE — RAD
XR ABDOMEN 1V



History: Reason: KIDNEY STONE / Spl. Instructions:  / History: 



Technique: Supine views of the abdomen.



Comparison: None.



Findings:

Imaged lung bases are unremarkable. Calcifications projecting over the pelvis, likely phleboliths.



Minimal small bowel gas. Air and stool throughout the colon. Multilevel lumbar spondylosis with mild 
S-shaped curvature the thoracolumbar spine.



Impression: 

1.  Calcifications projecting over the pelvis, likely phleboliths. If persistent clinical concern, CT
 can better evaluate.



Electronically signed by: Rudy Duong DO (4/14/2021 10:16 AM) [id number]

## 2021-04-16 ENCOUNTER — HOSPITAL ENCOUNTER (OUTPATIENT)
Dept: HOSPITAL 63 - LAB | Age: 71
End: 2021-04-16
Attending: SPECIALIST
Payer: MEDICARE

## 2021-04-16 DIAGNOSIS — R82.79: Primary | ICD-10-CM

## 2021-04-16 PROCEDURE — 87086 URINE CULTURE/COLONY COUNT: CPT

## 2021-05-28 ENCOUNTER — HOSPITAL ENCOUNTER (OUTPATIENT)
Dept: HOSPITAL 63 - SPEC | Age: 71
End: 2021-05-28
Payer: MEDICARE

## 2021-05-28 DIAGNOSIS — N39.0: Primary | ICD-10-CM

## 2021-05-28 PROCEDURE — 87086 URINE CULTURE/COLONY COUNT: CPT

## 2021-06-17 ENCOUNTER — VIRTUAL VISIT (OUTPATIENT)
Dept: ORTHOPEDIC SURGERY | Age: 71
End: 2021-06-17
Payer: MEDICARE

## 2021-06-17 DIAGNOSIS — M17.11 OSTEOARTHRITIS OF RIGHT KNEE, UNSPECIFIED OSTEOARTHRITIS TYPE: Primary | ICD-10-CM

## 2021-06-17 PROCEDURE — G8417 CALC BMI ABV UP PARAM F/U: HCPCS | Performed by: ORTHOPAEDIC SURGERY

## 2021-06-17 PROCEDURE — 1090F PRES/ABSN URINE INCON ASSESS: CPT | Performed by: ORTHOPAEDIC SURGERY

## 2021-06-17 PROCEDURE — 3017F COLORECTAL CA SCREEN DOC REV: CPT | Performed by: ORTHOPAEDIC SURGERY

## 2021-06-17 PROCEDURE — 99213 OFFICE O/P EST LOW 20 MIN: CPT | Performed by: ORTHOPAEDIC SURGERY

## 2021-06-17 PROCEDURE — G8400 PT W/DXA NO RESULTS DOC: HCPCS | Performed by: ORTHOPAEDIC SURGERY

## 2021-06-17 PROCEDURE — G8536 NO DOC ELDER MAL SCRN: HCPCS | Performed by: ORTHOPAEDIC SURGERY

## 2021-06-17 PROCEDURE — G8427 DOCREV CUR MEDS BY ELIG CLIN: HCPCS | Performed by: ORTHOPAEDIC SURGERY

## 2021-06-17 PROCEDURE — G8432 DEP SCR NOT DOC, RNG: HCPCS | Performed by: ORTHOPAEDIC SURGERY

## 2021-06-17 PROCEDURE — 1101F PT FALLS ASSESS-DOCD LE1/YR: CPT | Performed by: ORTHOPAEDIC SURGERY

## 2021-06-17 NOTE — PROGRESS NOTES
Name: Claudette Brewster    : 1950     Service Dept: [facility]    Patient's Pharmacies:    [facility] [id number] [facility] [address] [address]  Phone: [phone] Fax: [id number]  [address] [facility]  [place] [address]  Phone: [phone] Fax: [phone]       Chief Complaint   Patient presents with    Knee Pain        There were no vitals taken for this visit. Allergies   Allergen Reactions    Adhesive Anaphylaxis and Rash    Macrolide Antibiotics Anaphylaxis and Rash    Sulfa (Sulfonamide Antibiotics) Unknown (comments), Anaphylaxis, Swelling and Shortness of Breath    Lisinopril Swelling     Lip swelling  Lip swelling      Metoprolol Succinate Other (comments)    Verapamil Other (comments)    Alendronate Other (comments) and Rash    Ezetimibe Other (comments)    Nitrofurantoin Monohyd/M-Cryst Rash    Povidone-Iodine Other (comments)     Orange prep solution, had like a chemical burn    Statins-Hmg-Coa Reductase Inhibitors Other (comments)    Trimaphen Rash      Current Outpatient Medications   Medication Sig Dispense Refill    methylPREDNISolone (MEDROL DOSEPACK) 4 mg tablet Per dose pack instructions 1 Dose Pack 0    triamcinolone acetonide (KENALOG) 0.1 % topical cream Apply 15 g to affected area two (2) times a day. APPLY A THIN LAYER TO THE AFFECTED AREA(S) BY TOPICAL ROUTE 2 TIMES PER DAY FOR 7-10 DAYS 15 g 0    Walker misc Use while walking. 1 Each 0    ZINC SULFATE PO Take  by mouth.  cholecalciferol, vitamin d3, (Vitamin D3) 10 mcg (400 unit) cap Take 1 Tab by mouth daily.  docosahexaenoic acid/epa (FISH OIL PO) Take 1 Cap by mouth daily.  Lactobac no.41/Bifidobact no.7 (PROBIOTIC-10 PO) Take 1 Cap by mouth.       cycloSPORINE (Restasis) 0.05 % dpet Restasis 0.05 % eye drops in a dropperette      amLODIPine (NORVASC) 5 mg tablet Take 5 mg by mouth.  ascorbic acid, vitamin C, (VITAMIN C) 1,000 mg tablet Take 1,000 mg by mouth.  aspirin 81 mg chewable tablet Take 81 mg by mouth daily.  calcium-vitamin D (OYSTER SHELL) 500 mg(1,250mg) -200 unit per tablet Take 2 Tabs by mouth two (2) times daily (with meals).  denosumab (Prolia) 60 mg/mL injection Prolia 60 mg/mL subcutaneous syringe      dexamethasone (DECADRON) 0.1 % ophthalmic solution dexamethasone sodium phosphate 0.1 % eye drops      fexofenadine (ALLEGRA) 180 mg tablet Take 180 mg by mouth daily.  ibuprofen (MOTRIN) 200 mg tablet Take 400 mg by mouth.  Sea-Omega 500-1,000 mg cap Take 1,000 mg by mouth.  levoFLOXacin (LEVAQUIN) 250 mg tablet TK 1 T PO D FOR 5 DAYS      albuterol (PROVENTIL HFA, VENTOLIN HFA, PROAIR HFA) 90 mcg/actuation inhaler Take  by inhalation.  amoxicillin (AMOXIL) 500 mg capsule Take 500 mg by mouth.  cephALEXin (KEFLEX) 500 mg capsule Take 500 mg by mouth four (4) times daily.  dexAMETHasone (DECADRON) 0.5 mg/5 mL solution Take  by mouth daily.  dicyclomine (BENTYL) 10 mg capsule Take 10 mg by mouth 4 times daily (before meals and nightly).  doxycycline (VIBRAMYCIN) 100 mg capsule Take 100 mg by mouth two (2) times a day.  EPINEPHrine (EPIPEN) 0.3 mg/0.3 mL injection 0.3 mg by IntraMUSCular route once as needed for Allergic Response.  estradioL (ESTRACE) 0.01 % (0.1 mg/gram) vaginal cream Insert 2 g into vagina daily.  gabapentin (NEURONTIN) 100 mg capsule Take  by mouth three (3) times daily.  ibandronate (BONIVA) 150 mg tablet Take 150 mg by mouth every thirty (30) days.  levothyroxine (SYNTHROID) 150 mcg tablet Take  by mouth Daily (before breakfast).  levothyroxine (SYNTHROID) 175 mcg tablet Take 175 mcg by mouth Daily (before breakfast).       losartan (COZAAR) 50 mg tablet Take  by mouth daily.  meloxicam (MOBIC) 7.5 mg tablet Take  by mouth daily.  methylPREDNISolone (MEDROL DOSEPACK) 4 mg tablet Take  by mouth.  oxyCODONE-acetaminophen (PERCOCET) 5-325 mg per tablet Take  by mouth every four (4) hours as needed for Pain.  prednisoLONE acetate (PRED FORTE) 1 % ophthalmic suspension Administer 1 Drop to both eyes four (4) times daily.  promethazine-codeine (PHENERGAN with CODEINE) 6.25-10 mg/5 mL syrup Take  by mouth four (4) times daily as needed for Cough.  RABEprazole (ACIPHEX) 20 mg tablet Take 20 mg by mouth daily.  temazepam (RESTORIL) 30 mg capsule Take  by mouth nightly as needed for Sleep.  triamcinolone acetonide (KENALOG) 0.1 % topical cream Apply  to affected area two (2) times a day. use thin layer      amoxicillin-clavulanate (AUGMENTIN) 875-125 mg per tablet Take  by mouth every twelve (12) hours.  ampicillin (PRINCIPEN) 500 mg capsule Take  by mouth Before breakfast, lunch, dinner and at bedtime.  cefdinir (OMNICEF) 300 mg capsule Take 300 mg by mouth two (2) times a day.  ciprofloxacin HCl (CIPRO) 500 mg tablet Take  by mouth two (2) times a day.         Patient Active Problem List   Diagnosis Code    Severe obesity ([facility] [address] E66.01      Family History   Problem Relation Age of Onset    Cancer Mother     Heart Disease Mother     Heart Disease Father       Social History     Socioeconomic History    Marital status:      Spouse name: Not on file    Number of children: Not on file    Years of education: Not on file    Highest education level: Not on file   Tobacco Use    Smoking status: Former Smoker    Smokeless tobacco: Never Used   Substance and Sexual Activity    Alcohol use: Yes     Comment: Occasional     Social Determinants of Health     Financial Resource Strain:     Difficulty of Paying Living Expenses:    Food Insecurity:     Worried About Running Out of Food in the Last Year:     [address] in the Last Year:    Transportation Needs:     Lack of Transportation (Medical):  Lack of Transportation (Non-Medical):    Physical Activity:     Days of Exercise per Week:     Minutes of Exercise per Session:    Stress:     Feeling of Stress :    Social Connections:     Frequency of Communication with Friends and Family:     Frequency of Social Gatherings with Friends and Family:     Attends Jain Services:     Active Member of Clubs or Organizations:     Attends Club or Organization Meetings:     Marital Status:       Past Surgical History:   Procedure Laterality Date    HX KNEE REPLACEMENT        Past Medical History:   Diagnosis Date    Arthritis 04/29/2020    osteo    Heart disease     Hypertension         I have reviewed and agree with [address] [place] [address] Nw and ROS and intake form in chart and the record furthermore I have reviewed prior medical record(s) regarding this patients care during this appointment. Review of Systems:   Patient is a pleasant appearing individual, appropriately dressed, well hydrated, well nourished, who is alert, appropriately oriented for age, and in no acute distress with a normal gait and normal affect who does not appear to be in any significant pain. Physical Exam:  Right knee - Neurovascularly intact with good cap refill, full range of motion and full strength, well healed incision noted, no swelling, no erythema, no instability. Left knee - Decrease range of motion with flexion, Some crepitation, Grossly neurovascularly intact, Good cap refill, No skin lesion, Moderate swelling, No gross instability, Some quadriceps weakness   Encounter Diagnoses     ICD-10-CM ICD-9-CM   1. Osteoarthritis of right knee, unspecified osteoarthritis type  M17.11 715.96       HPI:  The patient is here with a chief complaint of right knee pain, dull, throbbing pain, status post right total knee replacement. Still having a little bit of popping-like sensation.     Assessment/Plan:  Plan at this point, we are going to get an x-ray, review the CD and give her a call back. She lives long distance. So, we are going to do a virtual appointment and go from there. As part of continued conservative pain management options the patient was advised to utilize Tylenol or OTC NSAIDS as long as it is not medically contraindicated. Cody Steiner, was evaluated through a synchronous (real-time) audio-video encounter. The patient (or guardian if applicable) is aware that this is a billable service. Verbal consent to proceed has been obtained within the past 12 months. The visit was conducted pursuant to the emergency declaration under the [facility], [address] [place] [address] authority and the [employer] and [employer] Act. Patient identification was verified, and a caregiver was present when appropriate. The patient was located in a state where the provider was credentialed to provide care. Return to Office: Follow-up and Dispositions    · Return for already scheduled for surgery. Scribed by Maricruz Godoy LPN as dictated by [facility] Boykin LIAM Rios MD.  Documentation True and Accepted Prashanth Rios MD

## 2021-06-17 NOTE — PATIENT INSTRUCTIONS
Knee Pain or Injury: Care Instructions Your Care Instructions Injuries are a common cause of knee problems. Sudden (acute) injuries may be caused by a direct blow to the knee. They can also be caused by abnormal twisting, bending, or falling on the knee. Pain, bruising, or swelling may be severe, and may start within minutes of the injury. Overuse is another cause of knee pain. Other causes are climbing stairs, kneeling, and other activities that use the knee. Everyday wear and tear, especially as you get older, also can cause knee pain. Rest, along with home treatment, often relieves pain and allows your knee to heal. If you have a serious knee injury, you may need tests and treatment. Follow-up care is a key part of your treatment and safety. Be sure to make and go to all appointments, and call your doctor if you are having problems. It's also a good idea to know your test results and keep a list of the medicines you take. How can you care for yourself at home? · Be safe with medicines. Read and follow all instructions on the label. ? If the doctor gave you a prescription medicine for pain, take it as prescribed. ? If you are not taking a prescription pain medicine, ask your doctor if you can take an over-the-counter medicine. · Rest and protect your knee. Take a break from any activity that may cause pain. · Put ice or a cold pack on your knee for 10 to 20 minutes at a time. Put a thin cloth between the ice and your skin. · Prop up a sore knee on a pillow when you ice it or anytime you sit or lie down for the next 3 days. Try to keep it above the level of your heart. This will help reduce swelling. · If your knee is not swollen, you can put moist heat, a heating pad, or a warm cloth on your knee. · If your doctor recommends an elastic bandage, sleeve, or other type of support for your knee, wear it as directed.  
· Follow your doctor's instructions about how much weight you can put on your leg. Use a cane, crutches, or a walker as instructed. · Follow your doctor's instructions about activity during your healing process. If you can do mild exercise, slowly increase your activity. · Reach and stay at a healthy weight. Extra weight can strain the joints, especially the knees and hips, and make the pain worse. Losing even a few pounds may help. When should you call for help? Call 911 anytime you think you may need emergency care. For example, call if: 
  · You have symptoms of a blood clot in your lung (called a pulmonary embolism). These may include: 
? Sudden chest pain. ? Trouble breathing. ? Coughing up blood. Call your doctor now or seek immediate medical care if: 
  · You have severe or increasing pain.  
  · Your leg or foot turns cold or changes color.  
  · You cannot stand or put weight on your knee.  
  · Your knee looks twisted or bent out of shape.  
  · You cannot move your knee.  
  · You have signs of infection, such as: 
? Increased pain, swelling, warmth, or redness. ? Red streaks leading from the knee. ? Pus draining from a place on your knee. ? A fever.  
  · You have signs of a blood clot in your leg (called a deep vein thrombosis), such as: 
? Pain in your calf, back of the knee, thigh, or groin. ? Redness and swelling in your leg or groin. Watch closely for changes in your health, and be sure to contact your doctor if: 
  · You have tingling, weakness, or numbness in your knee.  
  · You have any new symptoms, such as swelling.  
  · You have bruises from a knee injury that last longer than 2 weeks.  
  · You do not get better as expected. Where can you learn more? Go to http://www.gray.com/ Enter K195 in the search box to learn more about \"Knee Pain or Injury: Care Instructions. \" Current as of: February 26, 2020               Content Version: 12.8 © 4735-0375 [employer].   
Care instructions adapted under license by [employer] (which disclaims liability or warranty for this information). If you have questions about a medical condition or this instruction, always ask your healthcare professional. [address] any warranty or liability for your use of this information.

## 2021-06-21 RX ORDER — CEFUROXIME AXETIL 500 MG/1
TABLET ORAL
COMMUNITY
Start: 2021-03-15

## 2021-06-21 RX ORDER — AZELASTINE 1 MG/ML
SPRAY, METERED NASAL
COMMUNITY
Start: 2021-03-15

## 2021-06-22 ENCOUNTER — HOSPITAL ENCOUNTER (OUTPATIENT)
Dept: HOSPITAL 63 - RAD | Age: 71
End: 2021-06-22
Payer: MEDICARE

## 2021-06-22 DIAGNOSIS — M17.11: Primary | ICD-10-CM

## 2021-06-22 PROCEDURE — 73564 X-RAY EXAM KNEE 4 OR MORE: CPT

## 2021-06-22 NOTE — RAD
XR KNEE 4 VIEWS WITH PATELLA_RT



Clinical Indication: Reason: KNEE PAIN HX KNEE REPLACEMENT 



Comparison: None.



Findings: 

There is knee arthroplasty. There is no acute fracture. Resurfacing of the patella. Patella in anatom
ic position. No obvious joint effusion. There is lucency at the tip of the tibial stem measuring 2 mm
. This lucency appears more postsurgical then due to loosening. Comparison to prior radiographs would
 be useful. No other lucency adjacent to the prosthesis is seen. There are small well-corticated ossi
fic bodies lateral to the knee. No soft tissue swelling is identified.



IMPRESSION:

1.  Knee arthroplasty, alignment is anatomic.

2.  No acute fracture.



Electronically signed by: Chevy Fonseca MD (6/22/2021 10:32 AM) RIGOLUIS EDUARDO

## 2021-06-28 ENCOUNTER — HOSPITAL ENCOUNTER (OUTPATIENT)
Dept: HOSPITAL 63 - LAB | Age: 71
End: 2021-06-28
Attending: INTERNAL MEDICINE
Payer: MEDICARE

## 2021-06-28 DIAGNOSIS — G89.29: ICD-10-CM

## 2021-06-28 DIAGNOSIS — M54.5: Primary | ICD-10-CM

## 2021-06-28 DIAGNOSIS — G47.33: ICD-10-CM

## 2021-06-28 DIAGNOSIS — Z00.00: ICD-10-CM

## 2021-06-28 DIAGNOSIS — E03.4: ICD-10-CM

## 2021-06-28 DIAGNOSIS — I25.10: ICD-10-CM

## 2021-06-28 DIAGNOSIS — N20.0: ICD-10-CM

## 2021-06-28 LAB
ALBUMIN SERPL-MCNC: 3.7 G/DL (ref 3.4–5)
ALBUMIN/GLOB SERPL: 1.1 {RATIO} (ref 1–1.7)
ALP SERPL-CCNC: 137 U/L (ref 46–116)
ALT SERPL-CCNC: 31 U/L (ref 14–59)
ANION GAP SERPL CALC-SCNC: 12 MMOL/L (ref 6–14)
APTT PPP: YELLOW S
AST SERPL-CCNC: 21 U/L (ref 15–37)
BACTERIA #/AREA URNS HPF: 0 /HPF
BASOPHILS # BLD AUTO: 0.1 X10^3/UL (ref 0–0.2)
BASOPHILS NFR BLD: 2 % (ref 0–3)
BILIRUB SERPL-MCNC: 0.4 MG/DL (ref 0.2–1)
BILIRUB UR QL STRIP: (no result)
BUN/CREAT SERPL: 34 (ref 6–20)
CA-I SERPL ISE-MCNC: 24 MG/DL (ref 7–20)
CALCIUM SERPL-MCNC: 9.4 MG/DL (ref 8.5–10.1)
CHLORIDE SERPL-SCNC: 108 MMOL/L (ref 98–107)
CHOLEST/HDLC SERPL: 2 {RATIO}
CO2 SERPL-SCNC: 24 MMOL/L (ref 21–32)
CREAT SERPL-MCNC: 0.7 MG/DL (ref 0.6–1)
EOSINOPHIL NFR BLD: 0.2 X10^3/UL (ref 0–0.7)
EOSINOPHIL NFR BLD: 6 % (ref 0–3)
ERYTHROCYTE [DISTWIDTH] IN BLOOD BY AUTOMATED COUNT: 13.8 % (ref 11.5–14.5)
FIBRINOGEN PPP-MCNC: CLEAR MG/DL
GFR SERPLBLD BASED ON 1.73 SQ M-ARVRAT: 82.5 ML/MIN
GLOBULIN SER-MCNC: 3.3 G/DL (ref 2.2–3.8)
GLUCOSE SERPL-MCNC: 104 MG/DL (ref 70–99)
GLUCOSE UR STRIP-MCNC: (no result) MG/DL
HCT VFR BLD CALC: 38 % (ref 36–47)
HDLC SERPL-MCNC: 65 MG/DL (ref 40–60)
HGB BLD-MCNC: 13 G/DL (ref 12–15.5)
LDLC: 100 MG/DL (ref 0–100)
LYMPHOCYTES # BLD: 1.3 X10^3/UL (ref 1–4.8)
LYMPHOCYTES NFR BLD AUTO: 33 % (ref 24–48)
MCH RBC QN AUTO: 30 PG (ref 25–35)
MCHC RBC AUTO-ENTMCNC: 34 G/DL (ref 31–37)
MCV RBC AUTO: 88 FL (ref 79–100)
MONO #: 0.5 X10^3/UL (ref 0–1.1)
MONOCYTES NFR BLD: 12 % (ref 0–9)
NEUT #: 1.9 X10^3UL (ref 1.8–7.7)
NEUTROPHILS NFR BLD AUTO: 46 % (ref 31–73)
NITRITE UR QL STRIP: (no result)
PLATELET # BLD AUTO: 237 X10^3/UL (ref 140–400)
POTASSIUM SERPL-SCNC: 4.1 MMOL/L (ref 3.5–5.1)
PROT SERPL-MCNC: 7 G/DL (ref 6.4–8.2)
RBC # BLD AUTO: 4.33 X10^6/UL (ref 3.5–5.4)
RBC #/AREA URNS HPF: (no result) /HPF (ref 0–2)
SODIUM SERPL-SCNC: 144 MMOL/L (ref 136–145)
SP GR UR STRIP: 1.02
SQUAMOUS #/AREA URNS LPF: (no result) /LPF
T4 FREE SERPL-MCNC: 1.92 NG/DL (ref 0.76–1.46)
THYROID STIM HORMONE (TSH): 0.02 UIU/ML (ref 0.36–3.74)
TRIGL SERPL-MCNC: 111 MG/DL (ref 0–150)
UROBILINOGEN UR-MCNC: 0.2 MG/DL
VLDLC: 22 MG/DL (ref 0–40)
WBC # BLD AUTO: 4 X10^3/UL (ref 4–11)
WBC #/AREA URNS HPF: (no result) /HPF (ref 0–4)

## 2021-06-28 PROCEDURE — 84439 ASSAY OF FREE THYROXINE: CPT

## 2021-06-28 PROCEDURE — 80061 LIPID PANEL: CPT

## 2021-06-28 PROCEDURE — 84443 ASSAY THYROID STIM HORMONE: CPT

## 2021-06-28 PROCEDURE — 85025 COMPLETE CBC W/AUTO DIFF WBC: CPT

## 2021-06-28 PROCEDURE — 81001 URINALYSIS AUTO W/SCOPE: CPT

## 2021-06-28 PROCEDURE — 36415 COLL VENOUS BLD VENIPUNCTURE: CPT

## 2021-06-28 PROCEDURE — 80053 COMPREHEN METABOLIC PANEL: CPT

## 2021-07-29 ENCOUNTER — VIRTUAL VISIT (OUTPATIENT)
Dept: ORTHOPEDIC SURGERY | Age: 71
End: 2021-07-29

## 2021-07-29 ENCOUNTER — HOSPITAL ENCOUNTER (EMERGENCY)
Dept: HOSPITAL 63 - ER | Age: 71
Discharge: HOME | End: 2021-07-29
Payer: COMMERCIAL

## 2021-07-29 VITALS — BODY MASS INDEX: 36.13 KG/M2 | WEIGHT: 211.64 LBS | HEIGHT: 64 IN

## 2021-07-29 VITALS — DIASTOLIC BLOOD PRESSURE: 76 MMHG | SYSTOLIC BLOOD PRESSURE: 130 MMHG

## 2021-07-29 DIAGNOSIS — K21.9: ICD-10-CM
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
## 2021-07-29 DIAGNOSIS — M79.671: Primary | ICD-10-CM

## 2021-07-29 DIAGNOSIS — Z98.51: ICD-10-CM
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
## 2021-07-29 DIAGNOSIS — M17.11 OSTEOARTHRITIS OF RIGHT KNEE, UNSPECIFIED OSTEOARTHRITIS TYPE: Primary | ICD-10-CM

## 2021-07-29 DIAGNOSIS — I10: ICD-10-CM

## 2021-07-29 PROCEDURE — 1101F PT FALLS ASSESS-DOCD LE1/YR: CPT | Performed by: ORTHOPAEDIC SURGERY

## 2021-07-29 PROCEDURE — 3017F COLORECTAL CA SCREEN DOC REV: CPT | Performed by: ORTHOPAEDIC SURGERY

## 2021-07-29 PROCEDURE — G8536 NO DOC ELDER MAL SCRN: HCPCS | Performed by: ORTHOPAEDIC SURGERY

## 2021-07-29 PROCEDURE — 99213 OFFICE O/P EST LOW 20 MIN: CPT | Performed by: ORTHOPAEDIC SURGERY

## 2021-07-29 PROCEDURE — G8400 PT W/DXA NO RESULTS DOC: HCPCS | Performed by: ORTHOPAEDIC SURGERY

## 2021-07-29 PROCEDURE — G8417 CALC BMI ABV UP PARAM F/U: HCPCS | Performed by: ORTHOPAEDIC SURGERY

## 2021-07-29 PROCEDURE — G8427 DOCREV CUR MEDS BY ELIG CLIN: HCPCS | Performed by: ORTHOPAEDIC SURGERY

## 2021-07-29 PROCEDURE — 1090F PRES/ABSN URINE INCON ASSESS: CPT | Performed by: ORTHOPAEDIC SURGERY

## 2021-07-29 PROCEDURE — G8432 DEP SCR NOT DOC, RNG: HCPCS | Performed by: ORTHOPAEDIC SURGERY

## 2021-07-29 PROCEDURE — 72072 X-RAY EXAM THORAC SPINE 3VWS: CPT

## 2021-07-29 PROCEDURE — 73630 X-RAY EXAM OF FOOT: CPT

## 2021-07-29 NOTE — PATIENT INSTRUCTIONS
Knee Pain or Injury: Care Instructions  Your Care Instructions     Injuries are a common cause of knee problems. Sudden (acute) injuries may be caused by a direct blow to the knee. They can also be caused by abnormal twisting, bending, or falling on the knee. Pain, bruising, or swelling may be severe, and may start within minutes of the injury. Overuse is another cause of knee pain. Other causes are climbing stairs, kneeling, and other activities that use the knee. Everyday wear and tear, especially as you get older, also can cause knee pain. Rest, along with home treatment, often relieves pain and allows your knee to heal. If you have a serious knee injury, you may need tests and treatment. Follow-up care is a key part of your treatment and safety. Be sure to make and go to all appointments, and call your doctor if you are having problems. It's also a good idea to know your test results and keep a list of the medicines you take. How can you care for yourself at home? · Be safe with medicines. Read and follow all instructions on the label. ? If the doctor gave you a prescription medicine for pain, take it as prescribed. ? If you are not taking a prescription pain medicine, ask your doctor if you can take an over-the-counter medicine. · Rest and protect your knee. Take a break from any activity that may cause pain. · Put ice or a cold pack on your knee for 10 to 20 minutes at a time. Put a thin cloth between the ice and your skin. · Prop up a sore knee on a pillow when you ice it or anytime you sit or lie down for the next 3 days. Try to keep it above the level of your heart. This will help reduce swelling. · If your knee is not swollen, you can put moist heat, a heating pad, or a warm cloth on your knee. · If your doctor recommends an elastic bandage, sleeve, or other type of support for your knee, wear it as directed.   · Follow your doctor's instructions about how much weight you can put on your leg. Use a cane, crutches, or a walker as instructed. · Follow your doctor's instructions about activity during your healing process. If you can do mild exercise, slowly increase your activity. · Reach and stay at a healthy weight. Extra weight can strain the joints, especially the knees and hips, and make the pain worse. Losing even a few pounds may help. When should you call for help? Call 911 anytime you think you may need emergency care. For example, call if:    · You have symptoms of a blood clot in your lung (called a pulmonary embolism). These may include:  ? Sudden chest pain. ? Trouble breathing. ? Coughing up blood. Call your doctor now or seek immediate medical care if:    · You have severe or increasing pain.     · Your leg or foot turns cold or changes color.     · You cannot stand or put weight on your knee.     · Your knee looks twisted or bent out of shape.     · You cannot move your knee.     · You have signs of infection, such as:  ? Increased pain, swelling, warmth, or redness. ? Red streaks leading from the knee. ? Pus draining from a place on your knee. ? A fever.     · You have signs of a blood clot in your leg (called a deep vein thrombosis), such as:  ? Pain in your calf, back of the knee, thigh, or groin. ? Redness and swelling in your leg or groin. Watch closely for changes in your health, and be sure to contact your doctor if:    · You have tingling, weakness, or numbness in your knee.     · You have any new symptoms, such as swelling.     · You have bruises from a knee injury that last longer than 2 weeks.     · You do not get better as expected. Where can you learn more? Go to http://www.gray.com/  Enter K195 in the search box to learn more about \"Knee Pain or Injury: Care Instructions. \"  Current as of: February 26, 2020               Content Version: 12.8  © 0945-5794 [employer].    Care instructions adapted under license by [employer] (which disclaims liability or warranty for this information). If you have questions about a medical condition or this instruction, always ask your healthcare professional. [address] any warranty or liability for your use of this information.

## 2021-07-29 NOTE — PROGRESS NOTES
Name: Charlene Groves    : 1950     Service Dept: [facility]    Patient's Pharmacies:    [facility] [address] [id number] [facility] [address]  Phone: [phone] Fax: [phone] [facility]   [address]  [place] [address]  Phone: [phone] Fax: [phone]       Chief Complaint   Patient presents with    Knee Pain        There were no vitals taken for this visit. Allergies   Allergen Reactions    Adhesive Anaphylaxis and Rash    Macrolide Antibiotics Anaphylaxis and Rash    Sulfa (Sulfonamide Antibiotics) Unknown (comments), Anaphylaxis, Swelling and Shortness of Breath    Lisinopril Swelling     Lip swelling  Lip swelling      Metoprolol Succinate Other (comments)    Verapamil Other (comments)    Alendronate Other (comments) and Rash    Ezetimibe Other (comments)    Nitrofurantoin Monohyd/M-Cryst Rash    Povidone-Iodine Other (comments)     Orange prep solution, had like a chemical burn    Statins-Hmg-Coa Reductase Inhibitors Other (comments)    Trimaphen Rash        Current Outpatient Medications   Medication Sig Dispense Refill    azelastine (ASTELIN) 137 mcg (0.1 %) nasal spray USE 2 SPRAYS IN EACH NOSTRIL TWICE DAILY AS DIRECTED      cefUROXime (CEFTIN) 500 mg tablet TAKE 1 TABLET BY MOUTH EVERY 12 HOURS FOR 10 DAYS      cholecalciferol, vitamin d3, (Vitamin D3) 10 mcg (400 unit) cap Take 1 Tab by mouth daily.  docosahexaenoic acid/epa (FISH OIL PO) Take 1 Cap by mouth daily.  cycloSPORINE (Restasis) 0.05 % dpet Restasis 0.05 % eye drops in a dropperette      amLODIPine (NORVASC) 5 mg tablet Take 5 mg by mouth.  ascorbic acid, vitamin C, (VITAMIN C) 1,000 mg tablet Take 1,000 mg by mouth.  aspirin 81 mg chewable tablet Take 81 mg by mouth daily.  calcium-vitamin D (OYSTER SHELL) 500 mg(1,250mg) -200 unit per tablet Take 2 Tabs by mouth two (2) times daily (with meals).  denosumab (Prolia) 60 mg/mL injection Prolia 60 mg/mL subcutaneous syringe      dexamethasone (DECADRON) 0.1 % ophthalmic solution dexamethasone sodium phosphate 0.1 % eye drops      fexofenadine (ALLEGRA) 180 mg tablet Take 180 mg by mouth daily.  Sea-Omega 500-1,000 mg cap Take 1,000 mg by mouth.  levoFLOXacin (LEVAQUIN) 250 mg tablet TK 1 T PO D FOR 5 DAYS      albuterol (PROVENTIL HFA, VENTOLIN HFA, PROAIR HFA) 90 mcg/actuation inhaler Take  by inhalation.  cephALEXin (KEFLEX) 500 mg capsule Take 500 mg by mouth four (4) times daily.  dexAMETHasone (DECADRON) 0.5 mg/5 mL solution Take  by mouth daily.  dicyclomine (BENTYL) 10 mg capsule Take 10 mg by mouth 4 times daily (before meals and nightly).  doxycycline (VIBRAMYCIN) 100 mg capsule Take 100 mg by mouth two (2) times a day.  EPINEPHrine (EPIPEN) 0.3 mg/0.3 mL injection 0.3 mg by IntraMUSCular route once as needed for Allergic Response.  estradioL (ESTRACE) 0.01 % (0.1 mg/gram) vaginal cream Insert 2 g into vagina daily.  gabapentin (NEURONTIN) 100 mg capsule Take  by mouth three (3) times daily.  levothyroxine (SYNTHROID) 150 mcg tablet Take  by mouth Daily (before breakfast).  losartan (COZAAR) 50 mg tablet Take  by mouth daily.  meloxicam (MOBIC) 7.5 mg tablet Take  by mouth daily.  RABEprazole (ACIPHEX) 20 mg tablet Take 20 mg by mouth daily.  temazepam (RESTORIL) 30 mg capsule Take  by mouth nightly as needed for Sleep.  cefdinir (OMNICEF) 300 mg capsule Take 300 mg by mouth two (2) times a day.  ciprofloxacin HCl (CIPRO) 500 mg tablet Take  by mouth two (2) times a day.           Patient Active Problem List   Diagnosis Code    Severe obesity ([facility] [address] E66.01        Family History   Problem Relation Age of Onset    Cancer Mother     Heart Disease Mother     Heart Disease Father         Social History     Socioeconomic History    Marital status:      Spouse name: Not on file    Number of children: Not on file    Years of education: Not on file    Highest education level: Not on file   Tobacco Use    Smoking status: Former Smoker     Packs/day: 1.00     Years: 10.00     Pack years: 10.00     Quit date: 2000     Years since quittin.5    Smokeless tobacco: Never Used   Vaping Use    Vaping Use: Never used   Substance and Sexual Activity    Alcohol use: Yes     Comment: Occasional    Drug use: Never     Social Determinants of Health     Financial Resource Strain:     Difficulty of Paying Living Expenses:    Food Insecurity:     Worried About Running Out of Food in the Last Year:     [address] in the Last Year:    Transportation Needs:     Lack of Transportation (Medical):  Lack of Transportation (Non-Medical):    Physical Activity:     Days of Exercise per Week:     Minutes of Exercise per Session:    Stress:     Feeling of Stress :    Social Connections:     Frequency of Communication with Friends and Family:     Frequency of Social Gatherings with Friends and Family:     Attends Spiritism Services:     Active Member of Clubs or Organizations:     Attends Club or Organization Meetings:     Marital Status:         Past Surgical History:   Procedure Laterality Date    HX KNEE REPLACEMENT          Past Medical History:   Diagnosis Date    Arthritis 2020    osteo    Heart disease     Hypertension         I have reviewed and agree with [address] [place] [address] Nw and ROS and intake form in chart and the record. No diagnosis found. VIRTUAL APPOINTMENT    HPI:  The patient is here with a chief complaint of right knee pain, status post right knee replacement, virtual appointment. We reviewed the x-rays, and x-rays show well-aligned hardware with no new displacement.     Assessment/Plan:  Plan at this point, activities as tolerated, weightbearing started, no restrictions. We will see the patient back as needed and go from there. Giuliano Eckert, who was evaluated through a synchronous (real-time) audio-video encounter, and/or her healthcare decision maker, is aware that it is a billable service, with coverage as determined by her insurance carrier. She provided verbal consent to proceed: Yes, and patient identification was verified. It was conducted pursuant to the emergency declaration under the [address] [place] [address], [address] [place] [address] authority and the [employer] General Act. A caregiver was present when appropriate. Ability to conduct physical exam was limited. I was in the office. The patient was at home. 5-10 MIN VIRTUAL VISIT      Return to Office:        Scribed by John Just as dictated by [facility] LIAM Vela MD.    Documentation True and Accepted Prashanth Vela MD
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
## 2022-02-07 ENCOUNTER — TELEPHONE (OUTPATIENT)
Dept: ORTHOPEDIC SURGERY | Age: 72
End: 2022-02-07

## 2022-02-07 NOTE — TELEPHONE ENCOUNTER
Pt had surgery on both her knees over a year ago and fell last week, but [place] and I told her she could go see her PCP for xrays on that, she wants to know if Dr. Angelo Damon would want or need to see those xrays?